# Patient Record
Sex: MALE | Race: WHITE | NOT HISPANIC OR LATINO | Employment: FULL TIME | URBAN - METROPOLITAN AREA
[De-identification: names, ages, dates, MRNs, and addresses within clinical notes are randomized per-mention and may not be internally consistent; named-entity substitution may affect disease eponyms.]

---

## 2019-03-28 ENCOUNTER — TELEPHONE (OUTPATIENT)
Dept: GASTROENTEROLOGY | Facility: CLINIC | Age: 29
End: 2019-03-28

## 2019-03-28 NOTE — TELEPHONE ENCOUNTER
----- Message from Maria Teresa Abdon sent at 3/28/2019 12:34 PM CDT -----  Contact: self   waqas    Needs Advice    Reason for call: wants appt for abdominal cramps        Communication Preference: [phone]    Additional Information:

## 2019-03-28 NOTE — TELEPHONE ENCOUNTER
Spoke with patient.  Called to request an appointment with  for abdominal cramping.  Aware 's first available will be in July.  Was ok with seeing one of the NP's or PA.   Was able to schedule him an appointment with Vanita Baron NP on 4/2 at 1:00.  Confirmation mailed.

## 2019-04-02 ENCOUNTER — OFFICE VISIT (OUTPATIENT)
Dept: GASTROENTEROLOGY | Facility: CLINIC | Age: 29
End: 2019-04-02
Payer: MEDICAID

## 2019-04-02 VITALS
HEIGHT: 66 IN | WEIGHT: 134.06 LBS | SYSTOLIC BLOOD PRESSURE: 143 MMHG | DIASTOLIC BLOOD PRESSURE: 92 MMHG | BODY MASS INDEX: 21.55 KG/M2 | HEART RATE: 90 BPM

## 2019-04-02 DIAGNOSIS — R10.9 ABDOMINAL PAIN, UNSPECIFIED ABDOMINAL LOCATION: ICD-10-CM

## 2019-04-02 DIAGNOSIS — R10.13 EPIGASTRIC PAIN: Primary | ICD-10-CM

## 2019-04-02 PROCEDURE — 99999 PR PBB SHADOW E&M-EST. PATIENT-LVL III: CPT | Mod: PBBFAC,,, | Performed by: NURSE PRACTITIONER

## 2019-04-02 PROCEDURE — 99999 PR PBB SHADOW E&M-EST. PATIENT-LVL III: ICD-10-PCS | Mod: PBBFAC,,, | Performed by: NURSE PRACTITIONER

## 2019-04-02 PROCEDURE — 99203 OFFICE O/P NEW LOW 30 MIN: CPT | Mod: S$PBB,,, | Performed by: NURSE PRACTITIONER

## 2019-04-02 PROCEDURE — 99203 PR OFFICE/OUTPT VISIT, NEW, LEVL III, 30-44 MIN: ICD-10-PCS | Mod: S$PBB,,, | Performed by: NURSE PRACTITIONER

## 2019-04-02 PROCEDURE — 99213 OFFICE O/P EST LOW 20 MIN: CPT | Mod: PBBFAC | Performed by: NURSE PRACTITIONER

## 2019-04-02 RX ORDER — HYOSCYAMINE SULFATE 0.12 MG/1
0.12 TABLET SUBLINGUAL EVERY 6 HOURS PRN
Qty: 30 TABLET | Refills: 0 | Status: SHIPPED | OUTPATIENT
Start: 2019-04-02 | End: 2019-05-05

## 2019-04-02 RX ORDER — DICYCLOMINE HYDROCHLORIDE 20 MG/1
TABLET ORAL
Refills: 1 | COMMUNITY
Start: 2019-03-28 | End: 2019-04-02

## 2019-04-02 NOTE — PROGRESS NOTES
[facility] Consultation Note    Reason for Consult:  The primary encounter diagnosis was Epigastric pain. A diagnosis of Abdominal pain, unspecified abdominal location was also pertinent to this visit.    PCP:   De Johns   No address on file    Referring MD:  Aaareferral Self  No address on file    HPI:  This is a 29 y.o. male here for evaluation of abdominal pain.    Abdominal pain. Reports abdominal pain  Character: Pressure, Gas  Location: Starts epigastric then travels lower  Frequency: Once every 6-8 months. Last episode Sunday  Duration: 2 Days  Onset: Last couple of years  Worse with: Certain foods. Beans, brussels sprouts, enmanuel cheese  Improves with: Bowel Movement, Bentyl, Beuno  Associated with Bm: yes  Nocturnal pain: yes  Antidepressants: no  Using narcotic pain medication: no   Improvement with BENTYL, beuno  Doesn't take probiotics consistantly  Gets chills with the pain  Typically has one soft normal BM a day. But during these periods of pain he reports hard BMs  Denies blood in the stool, melena, nausea, vomiting,    Occasional pyrosis a couple times a month. This is not related to the epigastric pain.    Li screening- Family Hx of:  Colon cancer- no  Uterine cancer-no  Small bowel cancer-no  Ureter cancer-no  Renal pelvis cancer-no     ROS:  Constitutional: No fevers, chills, No weight loss  ENT: No allergies  CV: No chest pain  Pulm: No cough, No shortness of breath  Ophtho: No vision changes  GI: see HPI  Derm: No rash  Heme:  No bruising  MSK: No arthritis  : No dysuria, No hematuria  Endo: No hot or cold intolerance  Neuro: No syncope, No seizure  Psych: No anxiety, No depression    Medical History:  has no past medical history on file.    Surgical History:  has no past surgical history on file.    Family History: family history is not on file..     Social History:  reports that he has never smoked. He has never used smokeless tobacco. He reports that he  "drinks alcohol.    Review of patient's allergies indicates:  No Known Allergies    Current Outpatient Medications on File Prior to Visit   Medication Sig Dispense Refill    [DISCONTINUED] dicyclomine (BENTYL) 20 mg tablet TK 1 T PO Q 6 H PRF STOMACH CRAMPS  1     No current facility-administered medications on file prior to visit.          Objective Findings:    Vital Signs:  BP (!) 143/92   Pulse 90   Ht 5' 6" (1.676 m)   Wt 60.8 kg (134 lb 0.6 oz)   BMI 21.63 kg/m²   Body mass index is 21.63 kg/m².    Physical Exam:  General Appearance: Well appearing in no acute distress  Head:   Normocephalic, without obvious abnormality  Eyes:    No scleral icterus  ENT: Neck supple  Lungs: CTA bilaterally in anterior and posterior fields, no wheezes, no crackles.  Heart:  Regular rate and rhythm, S1, S2 normal, no murmurs heard  Abdomen: Soft,  non distended with positive bowel sounds in all four quadrants. Mild Tenderness in RLQ  Extremities: No edema  Skin: No rash  Neurologic: AAO x 3      Labs:  Lab Results   Component Value Date    WBC 8.40 04/02/2019    HGB 15.3 04/02/2019    HCT 46.0 04/02/2019     04/02/2019    ALT 21 04/02/2019    AST 25 04/02/2019     04/02/2019    K 4.0 04/02/2019     04/02/2019    CREATININE 1.1 04/02/2019    BUN 17 04/02/2019    CO2 30 (H) 04/02/2019       Imaging:  None    Endoscopy:    No colonoscopy    Assessment:  29 y.o. WM with a chronic history of occasional abdominal pain. This pain may occur every 6-8 months and is usually related to meals and relieved with a BM. Reports Urgent care visit in the past prescribed him bentyl and that relieve the pain.   I will r/o H. Pylori and Celiac disease. This does not classify as IBS since it happens so rarely, however I feel like there may be an irritable component since the pain is relieved with a BM.       Recommendations:  1. R/o H/ pylori and IBS  2. Try Levsin for the pain when it does occur.    Follow up if symptoms " worsen or fail to improve.      Order summary:  Orders Placed This Encounter    CBC auto differential    Comprehensive metabolic panel    Celiac Disease Panel    H. PYLORI ANTIBODY, IGG    hyoscyamine (LEVSIN/SL) 0.125 mg Subl         Thank you so much for allowing me to participate in the care of Kei Baron, ELEUTERIOP-C

## 2019-04-05 ENCOUNTER — TELEPHONE (OUTPATIENT)
Dept: GASTROENTEROLOGY | Facility: CLINIC | Age: 29
End: 2019-04-05

## 2019-04-05 NOTE — TELEPHONE ENCOUNTER
----- Message from Vanita Baron NP sent at 4/5/2019 11:36 AM CDT -----  Please let pt know all his labs were normal. Thanks

## 2019-05-05 ENCOUNTER — OFFICE VISIT (OUTPATIENT)
Dept: URGENT CARE | Facility: CLINIC | Age: 29
End: 2019-05-05
Payer: MEDICAID

## 2019-05-05 VITALS
DIASTOLIC BLOOD PRESSURE: 95 MMHG | BODY MASS INDEX: 21.53 KG/M2 | OXYGEN SATURATION: 97 % | HEART RATE: 63 BPM | SYSTOLIC BLOOD PRESSURE: 164 MMHG | TEMPERATURE: 98 F | WEIGHT: 134 LBS | HEIGHT: 66 IN | RESPIRATION RATE: 16 BRPM

## 2019-05-05 DIAGNOSIS — S46.211A BICEPS MUSCLE STRAIN, RIGHT, INITIAL ENCOUNTER: Primary | ICD-10-CM

## 2019-05-05 PROCEDURE — 99203 PR OFFICE/OUTPT VISIT, NEW, LEVL III, 30-44 MIN: ICD-10-PCS | Mod: S$GLB,,, | Performed by: PHYSICIAN ASSISTANT

## 2019-05-05 PROCEDURE — 99203 OFFICE O/P NEW LOW 30 MIN: CPT | Mod: S$GLB,,, | Performed by: PHYSICIAN ASSISTANT

## 2019-05-05 RX ORDER — IBUPROFEN 800 MG/1
800 TABLET ORAL EVERY 8 HOURS PRN
Qty: 40 TABLET | Refills: 0 | Status: SHIPPED | OUTPATIENT
Start: 2019-05-05 | End: 2020-05-04

## 2019-05-05 RX ORDER — KETOROLAC TROMETHAMINE 30 MG/ML
30 INJECTION, SOLUTION INTRAMUSCULAR; INTRAVENOUS
Status: COMPLETED | OUTPATIENT
Start: 2019-05-05 | End: 2019-05-05

## 2019-05-05 RX ADMIN — KETOROLAC TROMETHAMINE 30 MG: 30 INJECTION, SOLUTION INTRAMUSCULAR; INTRAVENOUS at 09:05

## 2019-05-05 NOTE — PROGRESS NOTES
"Subjective:       Patient ID: Kei Sharma is a 29 y.o. male.    Vitals:  height is 5' 6" (1.676 m) and weight is 60.8 kg (134 lb). His oral temperature is 98.2 °F (36.8 °C). His blood pressure is 164/95 (abnormal) and his pulse is 63. His respiration is 16 and oxygen saturation is 97%.     Chief Complaint: Arm Pain    Last night patient was playing of [employer] game and held his right arm and an awkward position lifted for a long period of time.  This morning he woke up with pain we localizes to his right bicep.  Denies numbness tingling or weakness    Arm Pain    The incident occurred 12 to 24 hours ago. The incident occurred at home. The injury mechanism is unknown. The pain is present in the upper right arm. The quality of the pain is described as aching and burning. The pain radiates to the right hand. The pain is at a severity of 7/10. The pain is mild. The pain has been constant since the incident. Pertinent negatives include no chest pain. The symptoms are aggravated by movement, lifting and palpation. He has tried acetaminophen (ibuprofen ) for the symptoms. The treatment provided mild relief.       Constitution: Negative for chills, fatigue and fever.   HENT: Negative for congestion and sore throat.    Neck: Negative for painful lymph nodes.   Cardiovascular: Negative for chest pain and leg swelling.   Eyes: Negative for double vision and blurred vision.   Respiratory: Negative for cough and shortness of breath.    Gastrointestinal: Negative for nausea, vomiting and diarrhea.   Genitourinary: Negative for dysuria, frequency and urgency.   Musculoskeletal: Positive for pain. Negative for joint pain, joint swelling, muscle cramps and muscle ache.   Skin: Negative for color change, pale and rash.   Allergic/Immunologic: Negative for seasonal allergies.   Neurological: Negative for dizziness, history of vertigo, light-headedness, passing out and headaches.   Hematologic/Lymphatic: Negative for swollen lymph " nodes, easy bruising/bleeding and history of blood clots. Does not bruise/bleed easily.   Psychiatric/Behavioral: Negative for nervous/anxious, sleep disturbance and depression. The patient is not nervous/anxious.        Objective:      Physical Exam   Constitutional: He is oriented to person, place, and time. He appears well-developed and well-nourished. He is cooperative.  Non-toxic appearance. He does not appear ill. No distress.   HENT:   Head: Normocephalic and atraumatic.   Right Ear: Hearing, tympanic membrane, external ear and ear canal normal.   Left Ear: Hearing, tympanic membrane, external ear and ear canal normal.   Nose: Nose normal. No mucosal edema, rhinorrhea or nasal deformity. No epistaxis. Right sinus exhibits no maxillary sinus tenderness and no frontal sinus tenderness. Left sinus exhibits no maxillary sinus tenderness and no frontal sinus tenderness.   Mouth/Throat: Uvula is midline, oropharynx is clear and moist and mucous membranes are normal. No trismus in the jaw. Normal dentition. No uvula swelling. No posterior oropharyngeal erythema.   Eyes: Conjunctivae and lids are normal. Right eye exhibits no discharge. Left eye exhibits no discharge. No scleral icterus.   Sclera clear bilat   Neck: Trachea normal, normal range of motion, full passive range of motion without pain and phonation normal. Neck supple.   Cardiovascular: Normal rate, regular rhythm, normal heart sounds, intact distal pulses and normal pulses.   Pulmonary/Chest: Effort normal and breath sounds normal. No respiratory distress.   Abdominal: Soft. Normal appearance and bowel sounds are normal. He exhibits no distension, no pulsatile midline mass and no mass. There is no tenderness.   Musculoskeletal: Normal range of motion. He exhibits no edema or deformity.        Right shoulder: He exhibits normal range of motion, no tenderness, no bony tenderness, no swelling, no effusion, no crepitus, no deformity, no laceration, no pain,  no spasm, normal pulse and normal strength.        Right elbow: He exhibits normal range of motion, no swelling, no effusion, no deformity and no laceration. No tenderness found.        Right wrist: He exhibits normal range of motion, no tenderness, no bony tenderness, no swelling, no effusion, no crepitus, no deformity and no laceration.        Right upper arm: He exhibits tenderness. He exhibits no bony tenderness, no swelling, no edema, no deformity and no laceration.        Right forearm: He exhibits no tenderness, no bony tenderness, no swelling, no edema, no deformity and no laceration.        Right hand: He exhibits normal range of motion, no tenderness, no bony tenderness, normal two-point discrimination, normal capillary refill, no deformity, no laceration and no swelling. Normal sensation noted. Normal strength noted.        Left hand: He exhibits normal range of motion, no tenderness, no bony tenderness, normal two-point discrimination, normal capillary refill, no deformity, no laceration and no swelling. Normal sensation noted. Normal strength noted.   TTP RIGHT BICEPS MUSCLE BELLY   Neurological: He is alert and oriented to person, place, and time. He exhibits normal muscle tone. Coordination normal.   Skin: Skin is warm, dry and intact. He is not diaphoretic. No pallor.   Psychiatric: He has a normal mood and affect. His speech is normal and behavior is normal. Judgment and thought content normal. Cognition and memory are normal.   Nursing note and vitals reviewed.      Assessment:       1. Biceps muscle strain, right, initial encounter        Plan:         Biceps muscle strain, right, initial encounter  -     ketorolac injection 30 mg  -     ibuprofen (ADVIL,MOTRIN) 800 MG tablet; Take 1 tablet (800 mg total) by mouth every 8 (eight) hours as needed.  Dispense: 40 tablet; Refill: 0          Muscle Strain in the Extremities  A muscle strain is a stretching and tearing of muscle fibers. This causes  pain, especially when you move that muscle. There may also be some swelling and bruising.  Home care  · Keep the hurt area raised to reduce pain and swelling. This is especially important during the first 48 hours.  · Apply an ice pack over the injured area for 15 to 20 minutes every 3 to 6 hours. You should do this for the first 24 to 48 hours. You can make an ice pack by filling a plastic bag that seals at the top with ice cubes and then wrapping it with a thin towel. Be careful not to injure your skin with the ice treatments. Ice should never be applied directly to skin. Continue the use of ice packs for relief of pain and swelling as needed. After 48 hours, apply heat (warm shower or warm bath) for 15 to 20 minutes several times a day, or alternate ice and heat.  · You may use over-the-counter pain medicine to control pain, unless another medicine was prescribed. If you have chronic liver or kidney disease or ever had a stomach ulcer or GI bleeding, talk with your healthcare provider before using these medicines.  · For leg strains: If crutches have been recommended, dont put full weight on the hurt leg until you can do so without pain. You can return to sports when you are able to hop and run on the injured leg without pain.  Follow-up care  Follow up with your healthcare provider, or as advised.  When to seek medical advice  Call your healthcare provider right away if any of these occur:  · The toes of the injured leg become swollen, cold, blue, numb, or tingly  · Pain or swelling increases  Date Last Reviewed: 11/19/2015 © 2000-2017 [employer]. [address] [place] [address], [place] [address]. All rights reserved. This information is not intended as a substitute for professional medical care. Always follow your healthcare professional's instructions.      Please follow up with your Primary care provider within 2-5 days if your signs and symptoms have not resolved or worsen.     If your condition  worsens or fails to improve we recommend that you receive another evaluation at the emergency room immediately or contact your primary medical clinic to discuss your concerns.   You must understand that you have received an Urgent Care treatment only and that you may be released before all of your medical problems are known or treated. You, the patient, will arrange for follow up care as instructed.     RED FLAGS/WARNING SYMPTOMS DISCUSSED WITH PATIENT THAT WOULD WARRANT EMERGENT MEDICAL ATTENTION. PATIENT VERBALIZED UNDERSTANDING.     Elevated Blood Pressure  Your blood pressure was elevated during your visit to the urgent care.  It was not so high that immediate care was needed but it is recommended that you monitor your blood pressure over the next week or two to make sure that it is not staying elevated.  Please have your blood pressure taken 2-3 times daily at different times of the day.  Write all of those blood pressures down and record the time that they were taken.  Keep all that information and take it with you to see your Primary Care Physician.  If your blood pressure is consistently above 140/90 you will need to follow up with your PCP more quickly

## 2019-05-05 NOTE — PATIENT INSTRUCTIONS
Muscle Strain in the Extremities  A muscle strain is a stretching and tearing of muscle fibers. This causes pain, especially when you move that muscle. There may also be some swelling and bruising.  Home care  · Keep the hurt area raised to reduce pain and swelling. This is especially important during the first 48 hours.  · Apply an ice pack over the injured area for 15 to 20 minutes every 3 to 6 hours. You should do this for the first 24 to 48 hours. You can make an ice pack by filling a plastic bag that seals at the top with ice cubes and then wrapping it with a thin towel. Be careful not to injure your skin with the ice treatments. Ice should never be applied directly to skin. Continue the use of ice packs for relief of pain and swelling as needed. After 48 hours, apply heat (warm shower or warm bath) for 15 to 20 minutes several times a day, or alternate ice and heat.  · You may use over-the-counter pain medicine to control pain, unless another medicine was prescribed. If you have chronic liver or kidney disease or ever had a stomach ulcer or GI bleeding, talk with your healthcare provider before using these medicines.  · For leg strains: If crutches have been recommended, dont put full weight on the hurt leg until you can do so without pain. You can return to sports when you are able to hop and run on the injured leg without pain.  Follow-up care  Follow up with your healthcare provider, or as advised.  When to seek medical advice  Call your healthcare provider right away if any of these occur:  · The toes of the injured leg become swollen, cold, blue, numb, or tingly  · Pain or swelling increases  Date Last Reviewed: 11/19/2015 © 2000-2017 [employer]. [address] [place] [address], [place] [address]. All rights reserved. This information is not intended as a substitute for professional medical care. Always follow your healthcare professional's instructions.      Please follow up with your Primary  care provider within 2-5 days if your signs and symptoms have not resolved or worsen.     If your condition worsens or fails to improve we recommend that you receive another evaluation at the emergency room immediately or contact your primary medical clinic to discuss your concerns.   You must understand that you have received an Urgent Care treatment only and that you may be released before all of your medical problems are known or treated. You, the patient, will arrange for follow up care as instructed.     RED FLAGS/WARNING SYMPTOMS DISCUSSED WITH PATIENT THAT WOULD WARRANT EMERGENT MEDICAL ATTENTION. PATIENT VERBALIZED UNDERSTANDING.     Elevated Blood Pressure  Your blood pressure was elevated during your visit to the urgent care.  It was not so high that immediate care was needed but it is recommended that you monitor your blood pressure over the next week or two to make sure that it is not staying elevated.  Please have your blood pressure taken 2-3 times daily at different times of the day.  Write all of those blood pressures down and record the time that they were taken.  Keep all that information and take it with you to see your Primary Care Physician.  If your blood pressure is consistently above 140/90 you will need to follow up with your PCP more quickly

## 2020-08-11 ENCOUNTER — TELEPHONE (OUTPATIENT)
Dept: INFECTIOUS DISEASES | Facility: CLINIC | Age: 30
End: 2020-08-11

## 2020-08-11 NOTE — TELEPHONE ENCOUNTER
DATE OF CALL: 8/11/2020  PROTOCOL: 2020.198  TITLE:  A phase 1/2/3, placebo-controlled, randomized, observer-blind, dose finding study to evaluate the safety, tolerability, immunogenicity, and efficacy of SARS-COV-2 RNA vaccine candidates against COVID-19 in healthy adults.  INVESTIGATOR: Magui Peterson  SPONSOR: [employer] is conducting the study for [employer]     Subject was contacted regarding participation in the [employer] COVID 19 Vaccine study. Upon screening, pt. would appear to meet inclusion/exclusion criteria.  Pt is scheduled for initial Pf[employer] vaccine study visit on 8/19/2020 @ 1pm .

## 2020-08-19 ENCOUNTER — CLINICAL SUPPORT (OUTPATIENT)
Dept: RESEARCH | Facility: CLINIC | Age: 30
End: 2020-08-19
Payer: MEDICAID

## 2020-08-19 DIAGNOSIS — Z23 NEED FOR VACCINATION: Primary | ICD-10-CM

## 2020-08-19 PROCEDURE — 91300 COVID-19, MRNA, LNP-S, PF, 30 MCG/0.3 ML DOSE VACCINE: ICD-10-PCS | Mod: ,,, | Performed by: INTERNAL MEDICINE

## 2020-08-19 PROCEDURE — 91300 COVID-19, MRNA, LNP-S, PF, 30 MCG/0.3 ML DOSE VACCINE: CPT | Mod: ,,, | Performed by: INTERNAL MEDICINE

## 2020-08-19 PROCEDURE — 99999 PR PBB SHADOW E&M-EST. PATIENT-LVL I: ICD-10-PCS | Mod: PBBFAC,,,

## 2020-08-19 PROCEDURE — 99211 OFF/OP EST MAY X REQ PHY/QHP: CPT | Mod: PBBFAC

## 2020-08-19 PROCEDURE — 99999 PR PBB SHADOW E&M-EST. PATIENT-LVL I: CPT | Mod: PBBFAC,,,

## 2020-08-19 NOTE — PROGRESS NOTES
Study title: A Phase 1/2/3. Placebo Controlled, Randomized, Observer-Blind, Dose-Finding Study to Describe the Safety, Tolerability, Immunogenicity and Potential Efficacy of SARS-COV-2 RNA Vaccine Candidates Against COVID-19 in Health Adults   IRB #: 2020.198  Sponsor: [employer]   Sponsor's Protocol: [id number]  Site Number: [id number]  Subject ID: [id number]    Visit Assessments; 8/19/2020    Screening of inclusion/exclusion criteria evaluation for [id number] has been reviewed by Shanon Rios. At this time, Kei Sharma meets all inclusion and does not meet any one of the exclusion criteria.   Screening procedures completed during this visit include the following:   Demographic data (including gender, age, ethnicity, date of birth);    Height and weight obtained;   Vital Signs  o B/P: 140/92  o Temperature: 98.8 F  o Pulse: 82    Reviewed and confirmed medical history in the past 6 months;   Review and confirmed of concomitant medications in the past 6 months;   Contraceptive discussion with Kei Sharma. Patient expressed full understanding of adequate contraceptive measures and will contact site immediately if any changes are made in contraceptives;   Was UPT completed? No  o If no, UPT was not performed because subject is male    Physical exam deemed NOT necessary per P.I./Sub-I discretion. I, Dr Peterson reviewed eligibility and agree with assessments. Subject will be randomized.

## 2020-08-19 NOTE — PROGRESS NOTES
Date consent signed: 8/19/2020    Study title: A Phase 1/2/3. Placebo Controlled, Randomized, Observer-Blind, Dose-Finding Study to Describe the Safety, Tolerability, Immunogenicity and Potential Efficacy of SARS-COV-2 RNA Vaccine Candidates Against COVID-19 in Health Adults   IRB #: 2020.198  Sponsor: [employer]   Sponsor's Protocol: [id number]  Site Number: [id number]  Subject ID: [id number]    Informed Consent Process  Present for discussion: Ryan Callegari, Corinne Abraham  Was the consent done electronically: yes     Prior to the Informed Consent (IC) being signed, or any protocol required testing, procedure, or intervention being performed, the following was done or discussed:  · Purpose of the Study, Qualifications to Participate: yes  · Study Design, Schedule and Procedures: yes  · Risks, Benefits, Alternative Treatments, Compensation and Costs: yes  · Confidentiality and HIPAA Authorization for Release of Medical Records for the research trial/subject's right/study related injury: yes  · Study related contact information: yes  · Voluntary Participation and Withdrawal from the research trial at any time: yes  · Patient has been offered the opportunity to ask questions regarding the study and all questions were answered satisfactorily: yes  · CRC and PI contact information given to patient: yes  · Signed copy given to patient: yes  · Copy in patient's chart and original uploaded to Epic: yes    Patient able to adequately summarize: the purpose of the study, the risks associated with the study, and all procedures, testing, and follow-ups associated with the study: yes    Kei Willardrick Zachary electronically signed the informed consent form with an IRB approval date of 8/6/2020. Each slide of the eConsent form was reviewed with him and all questions answered satisfactorily. He then electronically signed the consent form, which was countersigned by the CRC on this day. A copy of the fully executed ICF was then provided to  the subject via paper copy. The original consent was electronically saved and uploaded to the [facility] EMR ([facility]) and filed appropriately.     Person Obtaining Consent: Corinne Aileen Cronin Abraham

## 2020-09-11 ENCOUNTER — OFFICE VISIT (OUTPATIENT)
Dept: RESEARCH | Facility: CLINIC | Age: 30
End: 2020-09-11
Payer: MEDICAID

## 2020-09-11 DIAGNOSIS — Z00.6 RESEARCH SUBJECT: Primary | ICD-10-CM

## 2020-09-11 DIAGNOSIS — Z23 NEED FOR VACCINATION: ICD-10-CM

## 2020-09-11 PROCEDURE — 91300 COVID-19, MRNA, LNP-S, PF, 30 MCG/0.3 ML DOSE VACCINE: ICD-10-PCS | Mod: ,,, | Performed by: INTERNAL MEDICINE

## 2020-09-11 PROCEDURE — 99999 PR PBB SHADOW E&M-EST. PATIENT-LVL I: ICD-10-PCS | Mod: PBBFAC,,,

## 2020-09-11 PROCEDURE — 99999 PR PBB SHADOW E&M-EST. PATIENT-LVL I: CPT | Mod: PBBFAC,,,

## 2020-09-11 PROCEDURE — 91300 COVID-19, MRNA, LNP-S, PF, 30 MCG/0.3 ML DOSE VACCINE: CPT | Mod: ,,, | Performed by: INTERNAL MEDICINE

## 2020-09-11 PROCEDURE — 99211 OFF/OP EST MAY X REQ PHY/QHP: CPT | Mod: PBBFAC

## 2020-09-11 NOTE — PROGRESS NOTES
Study title: A Phase 1/2/3. Placebo Controlled, Randomized, Observer-Blind, Dose-Finding Study to Describe the Safety, Tolerability, Immunogenicity and Potential Efficacy of SARS-COV-2 RNA Vaccine Candidates Against COVID-19 in Health Adults   IRB #: 2020.198  Sponsor: [employer]   Sponsor's Protocol: [id number]  Site Number: [id number]  Subject ID: [id number]    Visit Assessments; 9/11/2020    The Subject presented for Visit 2 appointment as previously scheduled. Present during the visit are , Linnette Denise and participant, Kei Sharma. Participant wishes to continue participation in the trial, understands participation is voluntary and can withdraw at any point during the trial.     Visit 2 procedures completed during this visit include the following:      Review of Inclusion and Exclusion to determine the Subject's continued eligibility for study participation. Patient continues to meet eligibility. PI/Sub-I confirms and agrees to continued eligibility.    Review of Delay Criteria to confirm that the Subject is eligible for their second vaccination during today's visit.    Reviewed of adverse events since the Subject's Screening Visit.    Review for changes in concomitant medications since Subject's Screening Visit.    Review for changes in Contraceptive with Kei Sharma. If patient of childbearing potential or male capable of producing, adequate contraceptive refresher discussion occurred. Patient expressed full understanding of adequate contraceptive measures and will contact site immediately if any changes are made in contraceptives.    Was UPT completed? no  o UPT was not performed because subject is male    Per protocol, no physical required at V2 unless new or major health changes since last visit  or at PI discretion.     All visit assessment procedures have been completed according to protocol, IP order will be placed, and second vaccine will be prepared.

## 2020-09-12 ENCOUNTER — NURSE TRIAGE (OUTPATIENT)
Dept: ADMINISTRATIVE | Facility: CLINIC | Age: 30
End: 2020-09-12

## 2020-09-13 NOTE — TELEPHONE ENCOUNTER
Pt states that he has been having chills and fever of 102. Pt states that he has taken tylenol without relief at this time. Pt state that he is currently on a trial study for COVID 19.  Pt advised per protocol and verbalized understanding.    Reason for Disposition   [1] Fever AND [2] no signs of serious infection or localizing symptoms (all other triage questions negative)    Additional Information   Negative: Shock suspected (e.g., cold/pale/clammy skin, too weak to stand, low BP, rapid pulse)   Negative: Difficult to awaken or acting confused (e.g., disoriented, slurred speech)   Negative: [1] Difficulty breathing AND [2] bluish lips, tongue or face   Negative: New onset rash with multiple purple (or blood-colored) spots or dots   Negative: Sounds like a life-threatening emergency to the triager   Negative: [1] Headache AND [2] stiff neck (can't touch chin to chest)   Negative: Difficulty breathing   Negative: IV drug abuse   Negative: [1] Drinking very little AND [2] dehydration suspected (e.g., no urine > 12 hours, very dry mouth, very lightheaded)   Negative: Patient sounds very sick or weak to the triager  (Exception: mild weakness and hasn't taken fever medicine)   Negative: Fever > 104 F (40 C)   Negative: [1] Fever > 101 F (38.3 C) AND [2] age > 60   Negative: [1] Fever > 100.0 F (37.8 C) AND [2] bedridden (e.g., nursing home patient, CVA, chronic illness, recovering from surgery)   Negative: [1] Fever > 100.0 F (37.8 C) AND [2] indwelling urinary catheter (e.g., Rodriguez, Coude)   Negative: [1] Fever > 100.0 F (37.8 C) AND [2] has port (portacath), central line, or PICC line   Negative: [1] Fever > 100.0 F (37.8 C) AND [2] diabetes mellitus or weak immune system (e.g., HIV positive, cancer chemo, splenectomy, organ transplant, chronic steroids)   Negative: [1] Fever > 100.0 F (37.8 C) AND [2] surgery in the last month   Negative: Transplant patient (e.g., kidney, liver, lung, heart)    Negative: Fever present > 3 days (72 hours)   Negative: [1] Fever > 100.0 F (37.8 C) AND [2] foreign travel to a developing country in the last month   Negative: [1] Intermittent fever > 100.0 F (37.8 C) AND [2] lasts > 3 weeks    Protocols used: FEVER-A-AH

## 2020-09-14 ENCOUNTER — TELEPHONE (OUTPATIENT)
Dept: RESEARCH | Facility: CLINIC | Age: 30
End: 2020-09-14

## 2020-09-14 NOTE — TELEPHONE ENCOUNTER
Study title: A Phase 1/2/3. Placebo Controlled, Randomized, Observer-Blind, Dose-Finding Study to Describe the Safety, Tolerability, Immunogenicity and Potential Efficacy of SARS-COV-2 RNA Vaccine Candidates Against COVID-19 in Health Adults   IRB #: 2020.198  Sponsor: [employer]   Sponsor's Protocol: [id number]  Site Number: [id number]  Subject ID: [id number]    Subject reported fever of 102 on [id number]. Reported no other symptoms. Subject spoke with triage nurse who instructed subject to take Tylenol. Fever started to go down once Tylenol taken. Subject now has temp of 98.5. No potential COVID visit needed per PI discretion.

## 2020-09-28 ENCOUNTER — TELEPHONE (OUTPATIENT)
Dept: RESEARCH | Facility: CLINIC | Age: 30
End: 2020-09-28

## 2020-09-28 NOTE — TELEPHONE ENCOUNTER
Study title: A Phase 1/2/3. Placebo Controlled, Randomized, Observer-Blind, Dose-Finding Study to Describe the Safety, Tolerability, Immunogenicity and Potential Efficacy of SARS-COV-2 RNA Vaccine Candidates Against COVID-19 in Health Adults   IRB #: 2020.198  Sponsor: [employer]   Sponsor's Protocol: [id number]  Site Number: [id number]  Subject ID: [id number]    Patient has not completed COVID-19 Illness Diary in over one week.     Patient reminded to complete COVID-19 Illness Diary at least once a week, or anytime they experience symptoms. States that he will complete it today. He has not been getting notifications - instructed to set a personal reminder.     Patient was re-educated on completing eDiary as required per protocol. Patient expresses understanding.

## 2020-10-14 ENCOUNTER — RESEARCH ENCOUNTER (OUTPATIENT)
Dept: RESEARCH | Facility: CLINIC | Age: 30
End: 2020-10-14
Payer: MEDICAID

## 2020-10-14 NOTE — PROGRESS NOTES
Study title: A Phase 1/2/3. Placebo Controlled, Randomized, Observer-Blind, Dose-Finding Study to Describe the Safety, Tolerability, Immunogenicity and Potential Efficacy of SARS-COV-2 RNA Vaccine Candidates Against COVID-19 in Health Adults   IRB #: 2020.198  Sponsor: [employer]   Sponsor's Protocol: [id number]  Site Number: [id number]  Subject ID: [id number]    Visit Assessments; 10/14/2020    The Subject presented for Visit 3 appointment as previously scheduled. Present during the visit are , Paula Walker and participant, Kei Sharma. Participant wishes to continue participation in the trial, understands participation is voluntary and can withdraw at any point during the trial.     Visit 3 procedures completed during this visit include the following:      RADHA Freeman -Reviewed of adverse events since the Subject's Screening Visit.    RADHA Freeman -Review for changes in concomitant medications since Subject's Screening Visit.    RADHA Freeman -Review for changes in Contraceptive with Kei Sharma. If patient of childbearing potential or male capable of producing, adequate contraceptive refresher discussion occurred. Patient expressed full understanding of adequate contraceptive measures and will contact site immediately if any changes are made in contraceptives.        All visit assessment procedures have been completed according to protocol.

## 2020-12-23 ENCOUNTER — RESEARCH ENCOUNTER (OUTPATIENT)
Dept: RESEARCH | Facility: HOSPITAL | Age: 30
End: 2020-12-23

## 2020-12-23 NOTE — PROGRESS NOTES
Study title: A Phase 1/2/3. Placebo Controlled, Randomized, Observer-Blind, Dose-Finding Study to Describe the Safety, Tolerability, Immunogenicity and Potential Efficacy of SARS-COV-2 RNA Vaccine Candidates Against COVID-19 in Healthy Adults   IRB #: 2020.198  Sponsor: [employer]   Sponsor's Protocol: [id number]  Site Number: [id number]  Subject ID: [id number]     Subject contacted site regarding Pfizer's COVID-19 Vaccine Emergency Use Authorization (EUA). Site explained the process of unbinding at two potential time points. Kei Sharma stated he understands.     Does subject wish to be unblinded? yes    Kei Sharma was informed that unblinded site staff will perform unblinding procedures and site will follow up.

## 2021-02-15 ENCOUNTER — RESEARCH ENCOUNTER (OUTPATIENT)
Dept: RESEARCH | Facility: HOSPITAL | Age: 31
End: 2021-02-15

## 2021-03-12 ENCOUNTER — RESEARCH ENCOUNTER (OUTPATIENT)
Dept: RESEARCH | Facility: CLINIC | Age: 31
End: 2021-03-12
Payer: MEDICAID

## 2021-05-20 ENCOUNTER — TELEPHONE (OUTPATIENT)
Dept: RESEARCH | Facility: HOSPITAL | Age: 31
End: 2021-05-20

## 2021-08-03 ENCOUNTER — TELEPHONE (OUTPATIENT)
Dept: RESEARCH | Facility: CLINIC | Age: 31
End: 2021-08-03

## 2021-08-25 ENCOUNTER — TELEPHONE (OUTPATIENT)
Dept: RESEARCH | Facility: HOSPITAL | Age: 31
End: 2021-08-25

## 2021-08-26 ENCOUNTER — TELEPHONE (OUTPATIENT)
Dept: RESEARCH | Facility: HOSPITAL | Age: 31
End: 2021-08-26

## 2021-08-27 ENCOUNTER — RESEARCH ENCOUNTER (OUTPATIENT)
Dept: RESEARCH | Facility: CLINIC | Age: 31
End: 2021-08-27
Payer: MEDICAID

## 2021-10-04 ENCOUNTER — TELEPHONE (OUTPATIENT)
Dept: RESEARCH | Facility: CLINIC | Age: 31
End: 2021-10-04

## 2021-10-07 ENCOUNTER — TELEPHONE (OUTPATIENT)
Dept: RESEARCH | Facility: HOSPITAL | Age: 31
End: 2021-10-07

## 2021-10-08 ENCOUNTER — RESEARCH ENCOUNTER (OUTPATIENT)
Dept: RESEARCH | Facility: CLINIC | Age: 31
End: 2021-10-08
Payer: MEDICAID

## 2021-10-08 DIAGNOSIS — Z23 NEED FOR VACCINATION: Primary | ICD-10-CM

## 2021-10-08 PROCEDURE — 91300 COVID-19, MRNA, LNP-S, PF, 30 MCG/0.3 ML DOSE VACCINE: CPT | Mod: PBBFAC

## 2021-11-11 ENCOUNTER — RESEARCH ENCOUNTER (OUTPATIENT)
Dept: RESEARCH | Facility: HOSPITAL | Age: 31
End: 2021-11-11
Payer: MEDICAID

## 2021-12-20 ENCOUNTER — TELEPHONE (OUTPATIENT)
Dept: NEUROLOGY | Facility: CLINIC | Age: 31
End: 2021-12-20
Payer: COMMERCIAL

## 2022-02-17 ENCOUNTER — TELEPHONE (OUTPATIENT)
Dept: NEUROLOGY | Facility: CLINIC | Age: 32
End: 2022-02-17
Payer: COMMERCIAL

## 2022-02-17 ENCOUNTER — TELEPHONE (OUTPATIENT)
Dept: NEUROLOGY | Facility: CLINIC | Age: 32
End: 2022-02-17

## 2022-02-17 NOTE — TELEPHONE ENCOUNTER
----- Message from Margot Javier sent at 2/17/2022  1:29 PM CST -----  Contact: Patient  Type: Patient Call Back         Who called: Patient         What is the request in detail: I have Kei Sharma calling to resched his appt that he cxld on 1/7 fro hand spasm; wanted to see if he could be seen by Dr. Wilson if possible; please advise         [facility] Call back         Would the patient rather a call back or a response via [facility]? Call back         Best call back number: [phone]         Additional Information: prefers afternoon appts anyday of the week           Thank You

## 2022-02-17 NOTE — TELEPHONE ENCOUNTER
----- Message from Margot Javier sent at 2/17/2022  1:29 PM CST -----  Contact: Patient  Type: Patient Call Back         Who called: Patient         What is the request in detail: I have Kei Sharma calling to resched his appt that he cxld on 1/7 fro hand spasm; wanted to see if he could be seen by Dr. Wilson if possible; please advise         [facility]         Would the patient rather a call back or a response via [facility]? Call back         Best call back number: [phone]         Additional Information: prefers afternoon appts anyday of the week           Thank You

## 2022-02-25 ENCOUNTER — LAB VISIT (OUTPATIENT)
Dept: LAB | Facility: HOSPITAL | Age: 32
End: 2022-02-25
Payer: COMMERCIAL

## 2022-02-25 ENCOUNTER — OFFICE VISIT (OUTPATIENT)
Dept: NEUROLOGY | Facility: CLINIC | Age: 32
End: 2022-02-25
Payer: COMMERCIAL

## 2022-02-25 VITALS
WEIGHT: 117.75 LBS | HEIGHT: 66 IN | BODY MASS INDEX: 18.92 KG/M2 | DIASTOLIC BLOOD PRESSURE: 94 MMHG | HEART RATE: 105 BPM | SYSTOLIC BLOOD PRESSURE: 137 MMHG

## 2022-02-25 DIAGNOSIS — R74.01 TRANSAMINITIS: ICD-10-CM

## 2022-02-25 DIAGNOSIS — F41.1 ANXIETY, GENERALIZED: ICD-10-CM

## 2022-02-25 DIAGNOSIS — R29.0 CARPOPEDAL SPASM: Primary | ICD-10-CM

## 2022-02-25 DIAGNOSIS — R29.0 CARPOPEDAL SPASM: ICD-10-CM

## 2022-02-25 LAB
ALBUMIN SERPL BCP-MCNC: 4.3 G/DL (ref 3.5–5.2)
ALP SERPL-CCNC: 41 U/L (ref 55–135)
ALT SERPL W/O P-5'-P-CCNC: 50 U/L (ref 10–44)
ANION GAP SERPL CALC-SCNC: 10 MMOL/L (ref 8–16)
AST SERPL-CCNC: 65 U/L (ref 10–40)
BASOPHILS # BLD AUTO: 0.05 K/UL (ref 0–0.2)
BASOPHILS NFR BLD: 0.6 % (ref 0–1.9)
BILIRUB SERPL-MCNC: 1.5 MG/DL (ref 0.1–1)
BUN SERPL-MCNC: 14 MG/DL (ref 6–20)
CALCIUM SERPL-MCNC: 10.1 MG/DL (ref 8.7–10.5)
CHLORIDE SERPL-SCNC: 96 MMOL/L (ref 95–110)
CO2 SERPL-SCNC: 33 MMOL/L (ref 23–29)
CREAT SERPL-MCNC: 1.3 MG/DL (ref 0.5–1.4)
DIFFERENTIAL METHOD: ABNORMAL
EOSINOPHIL # BLD AUTO: 0.3 K/UL (ref 0–0.5)
EOSINOPHIL NFR BLD: 3.2 % (ref 0–8)
ERYTHROCYTE [DISTWIDTH] IN BLOOD BY AUTOMATED COUNT: 11.4 % (ref 11.5–14.5)
EST. GFR  (AFRICAN AMERICAN): >60 ML/MIN/1.73 M^2
EST. GFR  (NON AFRICAN AMERICAN): >60 ML/MIN/1.73 M^2
GLUCOSE SERPL-MCNC: 98 MG/DL (ref 70–110)
HCT VFR BLD AUTO: 44.1 % (ref 40–54)
HGB BLD-MCNC: 14.9 G/DL (ref 14–18)
IMM GRANULOCYTES # BLD AUTO: 0.02 K/UL (ref 0–0.04)
IMM GRANULOCYTES NFR BLD AUTO: 0.3 % (ref 0–0.5)
LYMPHOCYTES # BLD AUTO: 2.6 K/UL (ref 1–4.8)
LYMPHOCYTES NFR BLD: 32.9 % (ref 18–48)
MAGNESIUM SERPL-MCNC: 1.7 MG/DL (ref 1.6–2.6)
MCH RBC QN AUTO: 32 PG (ref 27–31)
MCHC RBC AUTO-ENTMCNC: 33.8 G/DL (ref 32–36)
MCV RBC AUTO: 95 FL (ref 82–98)
MONOCYTES # BLD AUTO: 0.7 K/UL (ref 0.3–1)
MONOCYTES NFR BLD: 9.4 % (ref 4–15)
NEUTROPHILS # BLD AUTO: 4.2 K/UL (ref 1.8–7.7)
NEUTROPHILS NFR BLD: 53.6 % (ref 38–73)
NRBC BLD-RTO: 0 /100 WBC
PHOSPHATE SERPL-MCNC: 4 MG/DL (ref 2.7–4.5)
PLATELET # BLD AUTO: 318 K/UL (ref 150–450)
PMV BLD AUTO: 9.8 FL (ref 9.2–12.9)
POTASSIUM SERPL-SCNC: 4.2 MMOL/L (ref 3.5–5.1)
PROT SERPL-MCNC: 7.9 G/DL (ref 6–8.4)
RBC # BLD AUTO: 4.65 M/UL (ref 4.6–6.2)
SODIUM SERPL-SCNC: 139 MMOL/L (ref 136–145)
WBC # BLD AUTO: 7.76 K/UL (ref 3.9–12.7)

## 2022-02-25 PROCEDURE — 80053 COMPREHEN METABOLIC PANEL: CPT | Performed by: STUDENT IN AN ORGANIZED HEALTH CARE EDUCATION/TRAINING PROGRAM

## 2022-02-25 PROCEDURE — 84100 ASSAY OF PHOSPHORUS: CPT | Performed by: STUDENT IN AN ORGANIZED HEALTH CARE EDUCATION/TRAINING PROGRAM

## 2022-02-25 PROCEDURE — 99203 PR OFFICE/OUTPT VISIT, NEW, LEVL III, 30-44 MIN: ICD-10-PCS | Mod: S$GLB,,, | Performed by: STUDENT IN AN ORGANIZED HEALTH CARE EDUCATION/TRAINING PROGRAM

## 2022-02-25 PROCEDURE — 85025 COMPLETE CBC W/AUTO DIFF WBC: CPT | Performed by: STUDENT IN AN ORGANIZED HEALTH CARE EDUCATION/TRAINING PROGRAM

## 2022-02-25 PROCEDURE — 36415 COLL VENOUS BLD VENIPUNCTURE: CPT | Performed by: STUDENT IN AN ORGANIZED HEALTH CARE EDUCATION/TRAINING PROGRAM

## 2022-02-25 PROCEDURE — 99999 PR PBB SHADOW E&M-EST. PATIENT-LVL III: CPT | Mod: PBBFAC,,, | Performed by: STUDENT IN AN ORGANIZED HEALTH CARE EDUCATION/TRAINING PROGRAM

## 2022-02-25 PROCEDURE — 83735 ASSAY OF MAGNESIUM: CPT | Performed by: STUDENT IN AN ORGANIZED HEALTH CARE EDUCATION/TRAINING PROGRAM

## 2022-02-25 PROCEDURE — 99203 OFFICE O/P NEW LOW 30 MIN: CPT | Mod: S$GLB,,, | Performed by: STUDENT IN AN ORGANIZED HEALTH CARE EDUCATION/TRAINING PROGRAM

## 2022-02-25 PROCEDURE — 99999 PR PBB SHADOW E&M-EST. PATIENT-LVL III: ICD-10-PCS | Mod: PBBFAC,,, | Performed by: STUDENT IN AN ORGANIZED HEALTH CARE EDUCATION/TRAINING PROGRAM

## 2022-02-25 NOTE — PROGRESS NOTES
[facility] - NEUROLOGY [address] [facility], [place]    Date: 2/25/22  Patient Name: Kei Sharma   MRN: [id number]   PCP: De Johns  Referring Provider: Self, Aaareferral    Assessment:     Kei Sharma is a 32 y.o. male presenting for evaluation and management of spasms;    Carpopedal Spasm; secondary to respiratory alkalosis. Ordered CBC/CMP to rule out transient electrolyte changes. Counseled on behavioral strategies during moments of excitement/hyperventilation.     Generalized Anxiety; previously seen by Psychiatry as an adolescent. Interested in re-establishing with Psychiatry for anxiety. Ordered ambulatory referral to Psychiatry for further management.     Transaminitis; mild. Noted on CMP following clinic visit. Contacted patient by phone following visit and discussed lab results. Likely secondary to increased EtOH use (admits to drinking more during Mardi Gras). Counseled on EtOH use in moderation. Ordered ambulatory referral to Internal Medicine for follow up and repeat labs. Advised to go to ED if experiencing severe abdominal pain, changes in bowel or bladder habits, or skin discoloration. Patient in agreement with plan.    Plan:     Problem List Items Addressed This Visit        Psychiatric    Anxiety, generalized    Current Assessment & Plan     - follow up with Psychiatry           Relevant Orders    Ambulatory referral/consult to Psychiatry       GI    Transaminitis    Current Assessment & Plan     - follow up with Internal Medicine/PCP           Relevant Orders    Ambulatory referral/consult to Internal Medicine       Other    Carpopedal spasm - Primary    Current Assessment & Plan     - benign. Counseled on behavioral strategies during spasms.           Relevant Orders    CBC auto differential (Completed)    COMPREHENSIVE METABOLIC PANEL (Completed)    Magnesium (Completed)    Phosphorus (Completed)          Kolton Adair MD    Patient  note was created using MModal Dictation.  Any errors in syntax or even information may not have been identified and edited on initial review prior to signing this note.  Subjective:        HPI:   Mr. Kei Sharma is a 32 y.o. male with past medical history of bulimia and scoliosis presents for evaluation and management of spasms.    Chief complaint; spasms. Onset 03/2021. 3 events with last episode occurring in December. Episodes last 5-10 minutes. Location is bilateral hands and face. Hand cramping and facial cramping lasts 1 minute. Associated with facial and bilateral hand numbness. Hasn't had episodes since 12/2021. Occurs during high stress/excitement. Notes during 2 of the events having 4-5 alcoholic beverages as well as moderate stress from life/work (pet was hospitalized during one event, the other the patient had significant work stress. Works as an . No LOC during events. Denies trauma to head or hands. Describes himself as a high anxiety person.       PAST MEDICAL HISTORY:  History reviewed. No pertinent past medical history.    PAST SURGICAL HISTORY:  History reviewed. No pertinent surgical history.    CURRENT MEDS:  No current outpatient medications on file.     Current Facility-Administered Medications   Medication Dose Route Frequency Provider Last Rate Last Admin    INV TVO751/placebo Injection 1 Dose  1 Dose Intramuscular 1 time in Clinic/HOD Magui Peterson MD        INV TVE847/placebo Injection 1 Dose  1 Dose Intramuscular 1 time in Clinic/HOD Magui Peterson MD        INV [id number] vaccine 1 Dose  1 Dose Intramuscular 1 time in Clinic/HOD Magui Peterson MD           ALLERGIES:  Review of patient's allergies indicates:  No Known Allergies    FAMILY HISTORY:  Family History   Problem Relation Age of Onset    No Known Problems Mother     No Known Problems Father        SOCIAL HISTORY:  Social History     Tobacco Use    Smoking status: Never Smoker  "   Smokeless tobacco: Never Used   Substance Use Topics    Alcohol use: Yes       Review of Systems:  12 system review of systems is negative except for the symptoms mentioned in HPI.      Objective:     Vitals:    02/25/22 1505   BP: (!) 137/94   BP Location: Right arm   Patient Position: Sitting   BP Method: Large (Automatic)   Pulse: 105   Weight: 53.4 kg (117 lb 11.6 oz)   Height: 5' 6" (1.676 m)     General: NAD, well nourished   Eyes: no tearing, discharge, no erythema   ENT: moist mucous membranes of the oral cavity, nares patent    Neck: Supple, full range of motion  Cardiovascular: Warm and well perfused, pulses equal and symmetrical  Lungs: Normal work of breathing, normal chest wall excursions  Skin: No rash, lesions, or breakdown on exposed skin  Psychiatry: Mood and affect are appropriate   Abdomen: soft, non tender, non distended  Extremities: No cyanosis, clubbing or edema.    Neurological   MENTAL STATUS: Alert and oriented to person, place, and time. Attention and concentration within normal limits. Speech without dysarthria, able to name and repeat without difficulty. Recent and remote memory within normal limits   CRANIAL NERVES: Visual fields intact. PERRL. EOMI. Facial sensation intact. Face symmetrical. Hearing grossly intact. Full shoulder shrug bilaterally. Tongue protrudes midline   SENSORY: Sensation is intact to light touch throughout.  Joint position perception intact. Negative Romberg.   MOTOR: Normal bulk and tone. No pronator drift. 5/5 deltoid, biceps, triceps, interosseous, hand  bilaterally. 5/5 iliopsoas, knee extension/flexion, foot dorsi/plantarflexion bilaterally.    REFLEXES: Symmetric and 3+ throughout.    CEREBELLAR/COORDINATION/GAIT: Gait steady with normal arm swing and stride length.  Heel to shin intact. Finger to nose intact. Normal rapid alternating movements. Mild tremor with outstretched bilateral extremities.  "

## 2022-03-17 ENCOUNTER — PATIENT MESSAGE (OUTPATIENT)
Dept: NEUROLOGY | Facility: CLINIC | Age: 32
End: 2022-03-17
Payer: COMMERCIAL

## 2022-04-01 ENCOUNTER — RESEARCH ENCOUNTER (OUTPATIENT)
Dept: RESEARCH | Facility: HOSPITAL | Age: 32
End: 2022-04-01
Payer: COMMERCIAL

## 2022-04-01 NOTE — PROGRESS NOTES
Study title: A Phase 1/2/3. Placebo Controlled, Randomized, Observer-Blind, Dose-Finding Study to Describe the Safety, Tolerability, Immunogenicity and Potential Efficacy of SARS-COV-2 RNA Vaccine Candidates Against COVID-19 in Healthy Adults   IRB #: 2020.198  Sponsor: [employer]   Sponsor's Protocol: [id number]  Site Number: [id number]  Subject ID: [id number]      Kei Sharma was contacted via telephone call on 4/1/2022 for their Visit 503, 6 month follow up. The following information was collected from him         Has the patient had any prohibited medications since their last visit? no   Has the patient had any major changes in health since their last visit (JANINE)? no   Has the patient had any non-study vaccinations since their last visit? no        Kei Sharma was informed that their next visit will be an in person visit. Subject instructed to continue completing their weekly Illness Diary and contact the site staff or investigator if a medically attend event or hospitalization occurs.

## 2022-04-18 ENCOUNTER — OFFICE VISIT (OUTPATIENT)
Dept: PSYCHIATRY | Facility: CLINIC | Age: 32
End: 2022-04-18
Payer: COMMERCIAL

## 2022-04-18 DIAGNOSIS — F41.1 ANXIETY, GENERALIZED: Primary | ICD-10-CM

## 2022-04-18 PROCEDURE — 90792 PR PSYCHIATRIC DIAGNOSTIC EVALUATION W/MEDICAL SERVICES: ICD-10-PCS | Mod: 95,,, | Performed by: NURSE PRACTITIONER

## 2022-04-18 PROCEDURE — 90792 PSYCH DIAG EVAL W/MED SRVCS: CPT | Mod: 95,,, | Performed by: NURSE PRACTITIONER

## 2022-04-18 NOTE — PROGRESS NOTES
"4/18/2022 3:10 PM   Kei Sharma   1990   [id number]           OUTPATIENT PSYCHIATRY INITIAL EVALUATION NOTE      Kei Sharma, a 32 y.o. male, presenting for initial evaluation visit. Met with patient.    Reason for Encounter: self-referral. 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 Patient reports a few months ago was having neurological issues upon stressful events happening, had been seen by neurology and dx'd with carpopedal spasm exacerbated by respiratory alkalosis and was then recommended to follow up with psychiatry to manage stress and for anxiety    Reports will get high anxiety "unnecessary stress" Reports feeling constant pressure of trying to get "everything straight" and "me shutting down sometimes."     "I feel like I need the most help not thinking about everything all at once." Reports "I need to tune into one thing to help with focus and have to hold on as hard."    "I don't like the idea of having to be in cram mode all the time." "I was a good student on paper and it doesn't mean that I have good habits."    During busier moments, start to shut down and "every little thing is the worse thing that's happened."    Trouble getting to sleep because "I just don't like sleeping that much."    Discussed to complete ADHD scale at next visit on 4/27 and continue further evaluation due to time constraints        Psychosocial stressors:    Psychiatric Review Of Systems - Is patient experiencing or having changes in:    Symptoms of Depression: Reports change in sleep, change in appetite and diminished concentration or cognition or indecisiveness Denies diminished mood or loss of interest/anhedonia, irritability, diminished energy, excessive guilt or hopelessness or worthlessness and suicidal ideations -  Denies Passive/Active SI  Onset was approximately several years " "ago. Symptoms have been gradually worsening since that time.      Symptoms of MARLEY: Reports excessive anxiety/worry/fear, about numerous issues, difficult to control, with restlessness, fatigue, poor concentration, irritability, muscle tension, sleep disturbance and causes functionally impairing distress Denies no anxiety symptoms  Onset was approximately several months ago. Symptoms have been gradually worsening since that time.      Symptoms of barron or hypomania: No elevated, expansive, or irritable mood with increased energy or activity; with inflated self-esteem or grandiosity, decreased need for sleep, increased rate of speech,  racing thoughts, distractibility, increased goal directed activity or PMA, risky/disinhibited behavior      Symptoms of psychosis: No hallucinations, delusions, disorganized thinking, disorganized behavior or abnormal motor behavior, or negative symptoms (diminshed emotional expression, avolition, anhedonia, alogia, asociality       Sleep: 5 hours of sleep per night. "goal-oriented" some kind of pet project to get done, "I feel like if I'm sleeping I'm not getting things done."    Risk Parameters:  Patient reports no suicidal ideation  Patient reports no homicidal ideation  Patient reports no self-injurious behavior  Patient reports no violent behavior    Other symptoms    Symptoms of Panic Disorder: No recurrent panic attacks, precipitated or un-precipitated, source of worry and/or behavioral changes secondary; with or without agoraphobia.     Have had a panic attack before, "massive panic attack to the point where I had to cancel the flight." "Little after Ce" cat was hit by a car, dog came down with pancreatis, "whole lot of stress to the point of being so overwhelmed."    Symptoms of PTSD: No h/o trauma; re-experiencing/intrusive symptoms, avoidant behavior, negative alterations in cognition or mood, or hyperarousal symptoms; with or without dissociative symptoms.     Sudden death " "of father, "was particularly traumatic" in 2015.     Symptoms of OCD: No obsessions, compulsions or ruminations. Reports have had issues with "thinking about death a lot and getting really stressed out from that." "but not to the point of being suicidal." "stuck in loops of what's the last thing you've ever gonna eat or last song I'm gonna hear."     Symptoms of Eating Disorders: No anorexia, bulimia or binging. Reports "bulimia at one point a few years ago."    Symptoms of ADHD: Reports inattention, difficulty with hyperactivity    MEDICAL REVIEW OF SYSTEMS  History obtained from the patient  1) General : NO chills or fever  2) Eyes: NO  visual changes  3) ENT: NO hearing change, nasal discharge or sore throat  4) Endocrine: NO weight changes or polydipsia/polyuria  5) Dermatological: NO rashes  6) Respiratory: NO cough, shortness of breath  7) Cardiovascular: NO chest pain, palpitations or racing heart  8) Gastrointestinal: NO nausea, vomiting, constipation or diarrhea  9) Musculoskeletal: NO muscle pain or stiffness  10) Neurological: NO confusion, dizziness, headaches or tremors  11) Psychiatric: please see HPI       Substance Use:   Denies      Psychotropic Medication Review:  Previous Trials-  none  Current meds-    Previous Psychiatric Hospitalizations: none    HISTORY         No past medical history on file.      History of Seizures or TBI: denies    No past surgical history on file.    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 "addiction to narcotics."     No family hx of schizophrenia, bipolar disorder, anxiety, depression. Reports substance abuse.     Social History:  Born and raised in [place], bachelor's degrees and chemistry and electrical engineering currently. Work full-time, work as , "current job for about year" worked as an  since 2019. "gifted program in grammar school at some point."    Very hot and cold childhood growing up, ultimately good, good friends, home life, parents had contentious relationship, not a traumatic childhood.     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 Reports "rely a lot on others to get work done so this gets staggers and this tough sometimes." Sometimes the work is "a lot of things coming at once and kind of freak out sometimes." Enjoy coworkers, and "very much do enjoy the job." Engaged, been with current partner for 12 years. No children. Live in same house, father's home, inherited when passed away. Kirstiekwesi manages accounts of several restaurants in  and .     Jeanine "is only person I feel comfortable talking to." Group of friends on and offline, college. Enjoy swimming, paddleboarding, video games, volunteer to help counter extremism, like to research a lot of    1 to 2 drinks per night. No THC or other rec. Drug use per report. No caffeine regularly. Stay "fit for the most part." Swim or run daily if possible. No . No prior alf time or arrests. Only child, step sibling "I don't talk to." Vaping nicotine bu tnot currently      Guns or other weapons in the home:      OBJECTIVE       Constitutional  Vitals:  Most recent vital signs, dated less than 90 days prior to this appointment, were reviewed.    There were no vitals filed for this visit.         Laboratory Data: Reviewed most recent     Medications:  No outpatient encounter medications on file as of 4/18/2022.     Facility-Administered Encounter Medications as " "of 4/18/2022   Medication Dose Route Frequency Provider Last Rate Last Admin    INV MYX487/placebo Injection 1 Dose  1 Dose Intramuscular 1 time in Clinic/HOD Magui Peterson MD        INV [id number]/placebo Injection 1 Dose  1 Dose Intramuscular 1 time in Clinic/HOD Magui Peterson MD        INV [id number] vaccine 1 Dose  1 Dose Intramuscular 1 time in Clinic/HOD Magui Peterson MD           Allergy:  Review of patient's allergies indicates:  No Known Allergies    Nutritional Screening: Considering the patient's height and weight, medications, medical history and preferences, should a referral be made to the dietitian? no    Review of Systems:  General: unremarkable, age appropriate  Resp:  No shortness of breath, hyperventilation or cough  Cvs:  No tachycardia or chest pain  Gi:  No nausea, vomiting, pain, constipation or diarrhea  Musculoskeletal:  No pain or stiffness of the joints  Muscle Strength/Tone:not examined  Neurological:  No weakness, sensory changes, seizures, confusion, memory loss, tremor or other abnormal movements   Gait & Station:non-ataxic    AIMS:  n/a *    Mental Status Exam:  Appearance: unremarkable, age appropriate, casually dressed  Behavior/Cooperation:appropriate friendly and cooperative   Speech: appropriate rate, volume and tone spontaneous   Language: uses words appropriately; NO aphasia or dysarthria  Mood: "  "  Affect:  full, congruent with mood and appropriate to situation/content.  Thought Process:  normal and logical  Thought Content: normal, no suicidality, no homicidality, delusions, or paranoia  Sensorium:  Awake  Alert and Oriented: x3 grossly intact  Memory: Intact to conversation both recent and remote  Attention/concentration: appropriate for age/education.   Insight: Intact  Judgment:Intact        Strengths and Liabilities: Strength: Patient accepts guidance/feedback, Strength: Patient is expressive/articulate., Strength: Patient is intelligent., Strength: " Patient is motivated for change., Strength: Patient is physically healthy., Strength: Patient has positive support network., Strength: Patient has reasonable judgment., Liability: Patient lacks coping skills.    ASSESSMENT     Impression: Anxiety NOS        Treatment Goals:  Specify outcomes written in observable, behavioral terms:   Anxiety: acquiring relapse prevention skills, reducing negative automatic thoughts, reducing physical symptoms of anxiety and reducing time spent worrying (<30 minutes/day)  Depression: acquiring relapse prevention skills, increasing energy, increasing interest in usual activities, increasing motivation, increasing self-reward for positive behaviors (one/day), increasing self-reward for positive thoughts (one/day), increasing social contacts (three/week), reducing excessive guilt, reducing fatigue and reducing negative automatic thoughts    TREATMENT PLAN     · Medication Management:   · Consider at next visit  · Labs: reviewed most recent labs  · The treatment plan and follow up plan were reviewed with the patient.  · Discussed with patient informed consent, risks vs. benefits, alternative treatments, side effect profile and the inherent unpredictability of individual responses to these treatments. The patient expresses understanding of the above and displays the capacity to agree with this current plan and had no other questions.  · Encouraged Patient to keep future appointments.   · Take medications as prescribed and abstain from substance abuse.   · In the event of an emergency patient was advised to go to the emergency room.  · Referral for further treatment to social work team for psychotherapy    Return to Clinic:  1 month    > than 50% of total time spend on coordination of care and counseling   (which included pts differential diagnosis and prognosis for psychiatric conditions, risks, benefits of treatments, instructions and adherence to treatment plan, risk reduction,  reviewing current psychiatric medication regimen, medical problems and social stressors. In addtion to possible discussion with other healthcare provider/s)    Add on Psychotherapy time: 0  Total Face to face time: 60mins    The patient location is: [place], at home  The chief complaint leading to consultation is: med f/u    Visit type: audiovisual    Face to Face time with patient: 60 min  60 minutes of total time spent on the encounter, which includes face to face time and non-face to face time preparing to see the patient (eg, review of tests), Obtaining and/or reviewing separately obtained history, Documenting clinical information in the electronic or other health record, Independently interpreting results (not separately reported) and communicating results to the patient/family/caregiver, or Care coordination (not separately reported).         Each patient to whom he or she provides medical services by telemedicine is:  (1) informed of the relationship between the physician and patient and the respective role of any other health care provider with respect to management of the patient; and (2) notified that he or she may decline to receive medical services by telemedicine and may withdraw from such care at any time.    Notes:     Kentrell Galvez, MSN, APRN, PMHNP-BC  [facility]   4/18/2022 3:10 PM

## 2022-04-27 ENCOUNTER — PATIENT MESSAGE (OUTPATIENT)
Dept: PSYCHIATRY | Facility: CLINIC | Age: 32
End: 2022-04-27
Payer: COMMERCIAL

## 2022-04-27 ENCOUNTER — OFFICE VISIT (OUTPATIENT)
Dept: PSYCHIATRY | Facility: CLINIC | Age: 32
End: 2022-04-27
Payer: COMMERCIAL

## 2022-04-27 DIAGNOSIS — F90.2 ADHD (ATTENTION DEFICIT HYPERACTIVITY DISORDER), COMBINED TYPE: Primary | ICD-10-CM

## 2022-04-27 DIAGNOSIS — F41.9 ANXIETY: ICD-10-CM

## 2022-04-27 PROCEDURE — 99215 OFFICE O/P EST HI 40 MIN: CPT | Mod: 95,,, | Performed by: NURSE PRACTITIONER

## 2022-04-27 PROCEDURE — 99215 PR OFFICE/OUTPT VISIT, EST, LEVL V, 40-54 MIN: ICD-10-PCS | Mod: 95,,, | Performed by: NURSE PRACTITIONER

## 2022-04-27 NOTE — PROGRESS NOTES
"4/27/2022 11:08 AM   Kei Sharma   1990   [id number]           OUTPATIENT PSYCHIATRY FOLLOW- UP VISIT    Reason for Encounter:  Kei Sharma, a 32 y.o. male,who presents today for follow up of initial visit. Met with patient.    Interval History and Content of Current Session:    Today,  Pt reports today as continuation of previous visit.     Completed WHO ADHD scale this visit which is consistent with ADHD. Reports had been placed in "gifted" courses in childhood, did well academically.     Reports is "ambivalent" towards therapy, "there is nothing I think I need to work through, I don't feel like the trauma or anything."    Patient reports goals would "I would like to slow down my brain a little bit." Reports "would like to focus on too many things at once."    Reports hasn't had muscle spasms lately.       Psychosocial stressors: occupational, family, social pressures      Review Of Systems:     Medical Review Of Systems:  Pertinent items are noted in HPI.    Psychiatric Review Of Systems:  sleep: no  appetite changes: no  weight changes: no  energy/anergy: no  interest/pleasure/anhedonia: no  somatic symptoms: no  libido: no  anxiety/panic: no  guilty/hopeless: no  S.I.B.s/risky behavior: no  any drugs: no  alcohol: no      Sleep: 5 hours of sleep per night, see HPI      Risk Parameters:  Patient reports no suicidal ideation  Patient reports no homicidal ideation  Patient reports no self-injurious behavior  Patient reports no violent behavior      Current meds-    Compliance: NA    Side effects: NA      Psychiatric, social, and family history reviewed this visit      Objective     ALL MEDICATIONS:  No current outpatient medications on file.    Current Facility-Administered Medications:     INV [id number]/placebo Injection 1 Dose, 1 Dose, Intramuscular, 1 time in Clinic/Magui BOLES MD    INV CYA123/placebo Injection 1 Dose, 1 Dose, Intramuscular, 1 time in Clinic/HODMagui " MD Nicholas    INV [id number] vaccine 1 Dose, 1 Dose, Intramuscular, 1 time in Clinic/HOD, Magui Peterson MD    ALLERGIES:  Review of patient's allergies indicates:  No Known Allergies    RELEVANT LABS/STUDIES:    Lab Results   Component Value Date    WBC 7.76 02/25/2022    HGB 14.9 02/25/2022    HCT 44.1 02/25/2022    MCV 95 02/25/2022     02/25/2022     BMP  Lab Results   Component Value Date     02/25/2022    K 4.2 02/25/2022    CL 96 02/25/2022    CO2 33 (H) 02/25/2022    BUN 14 02/25/2022    CREATININE 1.3 02/25/2022    CALCIUM 10.1 02/25/2022    ANIONGAP 10 02/25/2022    ESTGFRAFRICA >60.0 02/25/2022    EGFRNONAA >60.0 02/25/2022     Lab Results   Component Value Date    ALT 50 (H) 02/25/2022    AST 65 (H) 02/25/2022    ALKPHOS 41 (L) 02/25/2022    BILITOT 1.5 (H) 02/25/2022     No results found for: TSH  No results found for: LABA1C, HGBA1C    Constitutional  Vitals:  Most recent vital signs, dated less than 90 days prior to this appointment, were reviewed.    There were no vitals filed for this visit.         PHYSICAL EXAM  General: well developed, well nourished  Neurologic:   Gait: Normal   Psychomotor signs:  No involuntary movements or tremor  AIMS: none    PSYCHIATRIC EXAM:     Mental Status Exam:  Appearance: unremarkable, age appropriate  Behavior/Cooperation: normal, cooperative  Speech: normal tone, normal rate, normal pitch, normal volume  Language: uses words appropriately; NO aphasia or dysarthria  Mood: steady  Affect: full, congruent with mood and appropriate to content/situation  Thought Process: normal and logical  Thought Content: normal, no suicidality, no homicidality, delusions, or paranoia  Level of Consciousness: Alert and Oriented x3  Memory:  Intact  Attention/concentration: appropriate for age/education.   Fund of Knowledge: appears adequate  Insight:  Intact  Judgment: Intact     Assessment and Diagnosis   Status/Progress: Based on the examination completed  last visit and today patient presents with symptoms consistent with ADHD. He is academically gifted and appears intelligent so these symptoms are likely to have been unrecognized. He has some difficulty with anxiety and obsessive thinking and some perfectionistic tendencies.     General Impression:   ADHD, combined type  Anxiety, NOS      Intervention/Counseling/Treatment Plan   · Medication Management: -        Start Vyvanse 30 mg by mouth once daily pending UDS, vital signs and C/S form completion  · Labs: reviewed most recent  · The treatment plan and follow up plan were reviewed with the patient.  · Discussed with patient informed consent, risks vs. benefits, alternative treatments, side effect profile and the inherent unpredictability of individual responses to these treatments. The patient expresses understanding of the above and displays the capacity to agree with this current plan and had no other questions.  · Encouraged Patient to keep future appointments.   · Take medications as prescribed and abstain from substance abuse.   · In the event of an emergency patient was advised to go to the emergency room.  · Recommend psychotherapy this visit    Return to Clinic: 2 weeks, 1 month    > than 50% of total time spend on coordination of care and counseling   (which included pts differential diagnosis and prognosis for psychiatric conditions, risks, benefits of treatments, instructions and adherence to treatment plan, risk reduction, reviewing current psychiatric medication regimen, medical problems and social stressors. In addtion to possible discussion with other healthcare provider/s)    Add on Psychotherapy time:0  Total Face time: 48 min    The patient location is: [place], at home  The chief complaint leading to consultation is: f/u of initial visit    Visit type: audiovisual    Face to Face time with patient: 45 min  45 minutes of total time spent on the encounter, which includes face to face time and  non-face to face time preparing to see the patient (eg, review of tests), Obtaining and/or reviewing separately obtained history, Documenting clinical information in the electronic or other health record, Independently interpreting results (not separately reported) and communicating results to the patient/family/caregiver, or Care coordination (not separately reported).         Each patient to whom he or she provides medical services by telemedicine is:  (1) informed of the relationship between the physician and patient and the respective role of any other health care provider with respect to management of the patient; and (2) notified that he or she may decline to receive medical services by telemedicine and may withdraw from such care at any time.    Notes:     Kentrell Galvez, MSN, APRN, PMHNP-BC  [facility]   4/27/2022 11:08 AM

## 2022-05-04 ENCOUNTER — CLINICAL SUPPORT (OUTPATIENT)
Dept: PSYCHIATRY | Facility: CLINIC | Age: 32
End: 2022-05-04
Payer: COMMERCIAL

## 2022-05-04 VITALS
BODY MASS INDEX: 19.19 KG/M2 | HEART RATE: 81 BPM | HEIGHT: 66 IN | WEIGHT: 119.38 LBS | SYSTOLIC BLOOD PRESSURE: 136 MMHG | DIASTOLIC BLOOD PRESSURE: 97 MMHG

## 2022-05-04 DIAGNOSIS — F90.2 ADHD (ATTENTION DEFICIT HYPERACTIVITY DISORDER), COMBINED TYPE: Primary | ICD-10-CM

## 2022-05-04 LAB
AMPHET+METHAMPHET UR QL: NEGATIVE
BARBITURATES UR QL SCN>200 NG/ML: NEGATIVE
BENZODIAZ UR QL SCN>200 NG/ML: NEGATIVE
BZE UR QL SCN: NEGATIVE
CANNABINOIDS UR QL SCN: NEGATIVE
CREAT UR-MCNC: 138 MG/DL (ref 23–375)
ETHANOL UR-MCNC: <10 MG/DL
METHADONE UR QL SCN>300 NG/ML: NEGATIVE
OPIATES UR QL SCN: NEGATIVE
PCP UR QL SCN>25 NG/ML: NEGATIVE
TOXICOLOGY INFORMATION: NORMAL

## 2022-05-04 PROCEDURE — 80307 DRUG TEST PRSMV CHEM ANLYZR: CPT | Performed by: NURSE PRACTITIONER

## 2022-05-04 PROCEDURE — 99999 PR PBB SHADOW E&M-EST. PATIENT-LVL II: CPT | Mod: PBBFAC,,,

## 2022-05-04 PROCEDURE — 99999 PR PBB SHADOW E&M-EST. PATIENT-LVL II: ICD-10-PCS | Mod: PBBFAC,,,

## 2022-05-06 DIAGNOSIS — F90.0 ADHD (ATTENTION DEFICIT HYPERACTIVITY DISORDER), INATTENTIVE TYPE: Primary | ICD-10-CM

## 2022-05-06 RX ORDER — LISDEXAMFETAMINE DIMESYLATE 30 MG/1
30 CAPSULE ORAL EVERY MORNING
Qty: 30 CAPSULE | Refills: 0 | Status: SHIPPED | OUTPATIENT
Start: 2022-05-06 | End: 2022-09-20

## 2022-05-25 ENCOUNTER — OFFICE VISIT (OUTPATIENT)
Dept: PSYCHIATRY | Facility: CLINIC | Age: 32
End: 2022-05-25
Payer: COMMERCIAL

## 2022-05-25 DIAGNOSIS — F90.2 ADHD (ATTENTION DEFICIT HYPERACTIVITY DISORDER), COMBINED TYPE: Primary | ICD-10-CM

## 2022-05-25 DIAGNOSIS — F41.9 ANXIETY: ICD-10-CM

## 2022-05-25 PROCEDURE — 99214 OFFICE O/P EST MOD 30 MIN: CPT | Mod: 95,,, | Performed by: NURSE PRACTITIONER

## 2022-05-25 PROCEDURE — 99214 PR OFFICE/OUTPT VISIT, EST, LEVL IV, 30-39 MIN: ICD-10-PCS | Mod: 95,,, | Performed by: NURSE PRACTITIONER

## 2022-05-25 RX ORDER — LISDEXAMFETAMINE DIMESYLATE 30 MG/1
30 CAPSULE ORAL EVERY MORNING
Qty: 30 CAPSULE | Refills: 0 | Status: SHIPPED | OUTPATIENT
Start: 2022-05-25 | End: 2022-09-20

## 2022-05-25 NOTE — PROGRESS NOTES
"5/25/2022 2:00 PM   Kei Sharma   1990   [id number]                                                                   OUTPATIENT PSYCHIATRY FOLLOW- UP VISIT     Reason for Encounter:  Kei Sharma, a 32 y.o. male,who presents today for follow up of initial visit. Met with patient.     Interval History and Content of Current Session:     Today,  Pt reports today as a follow up from previous visit about 1 month prior. Reports since starting medication, reports overall helpful - improving focus, reducing distractibility, reduced anxiety.     Reports has noticed less moments of feeling overwhelmed and stressed, but "not panicky." Reports "don't get up for breaks as much." and "bit more disciplined."    Lasting through out the work day from am until the pm. Reports taking during the work week but recently during the weekend. No changes in appetite. Reports "large wave of tiredness come over me" in the afternoon after work.     Checked BP at 142/89 which is about consistent with prior BP completed before 138/92. Discussed checking at home as well.     Denies SI/HI, AVH, racing thoughts, mood lability     Psychosocial stressors: occupational, family, social pressures        Review Of Systems:      Medical Review Of Systems:  Pertinent items are noted in HPI.     Psychiatric Review Of Systems:  sleep: yes, but intermittent, 6 to 7 hours of sleep nightly  appetite changes: no  weight changes: no  energy/anergy: no  interest/pleasure/anhedonia: no  somatic symptoms: no  libido: no  anxiety/panic: no  guilty/hopeless: no  S.I.B.s/risky behavior: no  any drugs: no  alcohol: no       Sleep: 6 to 7 hours of sleep per night, see HPI        Risk Parameters:  Patient reports no suicidal ideation  Patient reports no homicidal ideation  Patient reports no self-injurious behavior  Patient reports no violent behavior        Current meds-     Compliance: NA     Side effects: mild tiredness/fatigue in the " "afternoons        Psychiatric, social, and family history reviewed this visit        Objective      ALL MEDICATIONS:  No current outpatient medications on file.     Current Facility-Administered Medications:     INV [id number]/placebo Injection 1 Dose, 1 Dose, Intramuscular, 1 time in Clinic/HOD, Magui Peterson MD    INV [id number]/placebo Injection 1 Dose, 1 Dose, Intramuscular, 1 time in Clinic/HOD, Magui Peterson MD    INV [id number] vaccine 1 Dose, 1 Dose, Intramuscular, 1 time in Clinic/HOD, Magui Peterson MD     ALLERGIES:  Review of patient's allergies indicates:  No Known Allergies     RELEVANT LABS/STUDIES:          Lab Results   Component Value Date     WBC 7.76 02/25/2022     HGB 14.9 02/25/2022     HCT 44.1 02/25/2022     MCV 95 02/25/2022      02/25/2022      BMP        Lab Results   Component Value Date      02/25/2022     K 4.2 02/25/2022     CL 96 02/25/2022     CO2 33 (H) 02/25/2022     BUN 14 02/25/2022     CREATININE 1.3 02/25/2022     CALCIUM 10.1 02/25/2022     ANIONGAP 10 02/25/2022     ESTGFRAFRICA >60.0 02/25/2022     EGFRNONAA >60.0 02/25/2022            Lab Results   Component Value Date     ALT 50 (H) 02/25/2022     AST 65 (H) 02/25/2022     ALKPHOS 41 (L) 02/25/2022     BILITOT 1.5 (H) 02/25/2022      No results found for: TSH  No results found for: LABA1C, HGBA1C     Constitutional  Vitals:  Most recent vital signs, dated less than 90 days prior to this appointment, were reviewed.    There were no vitals filed for this visit.            PHYSICAL EXAM  General: well developed, well nourished  Neurologic:   Gait: Normal   Psychomotor signs:  No involuntary movements or tremor  AIMS: none     PSYCHIATRIC EXAM:     Mental Status Exam:  Appearance: unremarkable, age appropriate  Behavior/Cooperation: normal, cooperative  Speech: normal tone, normal rate, normal pitch, normal volume  Language: uses words appropriately; NO aphasia or dysarthria  Mood: "I feel " "fine"  Affect: full, congruent with mood and appropriate to content/situation  Thought Process: normal and logical  Thought Content: normal, no suicidality, no homicidality, delusions, or paranoia  Level of Consciousness: Alert and Oriented x3  Memory:  Intact  Attention/concentration: appropriate for age/education.   Fund of Knowledge: appears adequate  Insight:  Intact  Judgment: Intact      Assessment and Diagnosis   Status/Progress: Based on the examination completed last visit and today patient presents with symptoms consistent with ADHD. He is academically gifted and appears intelligent so these symptoms are likely to have been unrecognized. He has some difficulty with anxiety and obsessive thinking and some perfectionistic tendencies.      General Impression:   ADHD, combined type  Anxiety, NOS        Intervention/Counseling/Treatment Plan   · Medication Management: -        Continue Vyvanse 30 mg by mouth once daily  · Labs: reviewed most recent  · The treatment plan and follow up plan were reviewed with the patient.  · Discussed with patient informed consent, risks vs. benefits, alternative treatments, side effect profile and the inherent unpredictability of individual responses to these treatments. The patient expresses understanding of the above and displays the capacity to agree with this current plan and had no other questions.  · Encouraged Patient to keep future appointments.   · Take medications as prescribed and abstain from substance abuse.   · In the event of an emergency patient was advised to go to the emergency room.  · Recommend psychotherapy this visit     Return to Clinic: 1 to 3 months     > than 50% of total time spend on coordination of care and counseling   (which included pts differential diagnosis and prognosis for psychiatric conditions, risks, benefits of treatments, instructions and adherence to treatment plan, risk reduction, reviewing current psychiatric medication regimen, " medical problems and social stressors. In addtion to possible discussion with other healthcare provider/s)     Add on Psychotherapy time:0  Total Face time: 20 min     The patient location is: [place], at home  The chief complaint leading to consultation is: f/u of initial visit     Visit type: audiovisual     Face to Face time with patient: 20 min  25 minutes of total time spent on the encounter, which includes face to face time and non-face to face time preparing to see the patient (eg, review of tests), Obtaining and/or reviewing separately obtained history, Documenting clinical information in the electronic or other health record, Independently interpreting results (not separately reported) and communicating results to the patient/family/caregiver, or Care coordination (not separately reported).            Each patient to whom he or she provides medical services by telemedicine is:  (1) informed of the relationship between the physician and patient and the respective role of any other health care provider with respect to management of the patient; and (2) notified that he or she may decline to receive medical services by telemedicine and may withdraw from such care at any time.     Notes:      Kentrell Galvez, MSN, APRN, PMHNP-BC  [facility]   5/25/2022 2:00 PM

## 2022-08-09 ENCOUNTER — OFFICE VISIT (OUTPATIENT)
Dept: PRIMARY CARE CLINIC | Facility: CLINIC | Age: 32
End: 2022-08-09
Payer: COMMERCIAL

## 2022-08-09 VITALS
TEMPERATURE: 98 F | DIASTOLIC BLOOD PRESSURE: 82 MMHG | HEART RATE: 64 BPM | WEIGHT: 122.13 LBS | BODY MASS INDEX: 19.63 KG/M2 | HEIGHT: 66 IN | SYSTOLIC BLOOD PRESSURE: 120 MMHG | OXYGEN SATURATION: 98 %

## 2022-08-09 DIAGNOSIS — Z00.00 WELL ADULT EXAM: Primary | ICD-10-CM

## 2022-08-09 DIAGNOSIS — Z23 NEED FOR TDAP VACCINATION: ICD-10-CM

## 2022-08-09 DIAGNOSIS — Z12.83 SKIN CANCER SCREENING: ICD-10-CM

## 2022-08-09 DIAGNOSIS — F98.8 ATTENTION DEFICIT DISORDER, UNSPECIFIED HYPERACTIVITY PRESENCE: ICD-10-CM

## 2022-08-09 DIAGNOSIS — Z11.3 SCREEN FOR STD (SEXUALLY TRANSMITTED DISEASE): ICD-10-CM

## 2022-08-09 PROCEDURE — 3079F PR MOST RECENT DIASTOLIC BLOOD PRESSURE 80-89 MM HG: ICD-10-PCS | Mod: CPTII,S$GLB,, | Performed by: FAMILY MEDICINE

## 2022-08-09 PROCEDURE — 99999 PR PBB SHADOW E&M-EST. PATIENT-LVL V: CPT | Mod: PBBFAC,,, | Performed by: FAMILY MEDICINE

## 2022-08-09 PROCEDURE — 3008F PR BODY MASS INDEX (BMI) DOCUMENTED: ICD-10-PCS | Mod: CPTII,S$GLB,, | Performed by: FAMILY MEDICINE

## 2022-08-09 PROCEDURE — 99395 PR PREVENTIVE VISIT,EST,18-39: ICD-10-PCS | Mod: S$GLB,,, | Performed by: FAMILY MEDICINE

## 2022-08-09 PROCEDURE — 3008F BODY MASS INDEX DOCD: CPT | Mod: CPTII,S$GLB,, | Performed by: FAMILY MEDICINE

## 2022-08-09 PROCEDURE — 1159F MED LIST DOCD IN RCRD: CPT | Mod: CPTII,S$GLB,, | Performed by: FAMILY MEDICINE

## 2022-08-09 PROCEDURE — 99395 PREV VISIT EST AGE 18-39: CPT | Mod: S$GLB,,, | Performed by: FAMILY MEDICINE

## 2022-08-09 PROCEDURE — 99999 PR PBB SHADOW E&M-EST. PATIENT-LVL V: ICD-10-PCS | Mod: PBBFAC,,, | Performed by: FAMILY MEDICINE

## 2022-08-09 PROCEDURE — 1160F PR REVIEW ALL MEDS BY PRESCRIBER/CLIN PHARMACIST DOCUMENTED: ICD-10-PCS | Mod: CPTII,S$GLB,, | Performed by: FAMILY MEDICINE

## 2022-08-09 PROCEDURE — 3079F DIAST BP 80-89 MM HG: CPT | Mod: CPTII,S$GLB,, | Performed by: FAMILY MEDICINE

## 2022-08-09 PROCEDURE — 3074F SYST BP LT 130 MM HG: CPT | Mod: CPTII,S$GLB,, | Performed by: FAMILY MEDICINE

## 2022-08-09 PROCEDURE — 1159F PR MEDICATION LIST DOCUMENTED IN MEDICAL RECORD: ICD-10-PCS | Mod: CPTII,S$GLB,, | Performed by: FAMILY MEDICINE

## 2022-08-09 PROCEDURE — 1160F RVW MEDS BY RX/DR IN RCRD: CPT | Mod: CPTII,S$GLB,, | Performed by: FAMILY MEDICINE

## 2022-08-09 PROCEDURE — 3074F PR MOST RECENT SYSTOLIC BLOOD PRESSURE < 130 MM HG: ICD-10-PCS | Mod: CPTII,S$GLB,, | Performed by: FAMILY MEDICINE

## 2022-08-10 PROBLEM — Z23 NEED FOR TDAP VACCINATION: Status: ACTIVE | Noted: 2022-08-10

## 2022-08-10 PROBLEM — F98.8 ATTENTION DEFICIT DISORDER: Status: ACTIVE | Noted: 2022-08-10

## 2022-08-10 NOTE — PROGRESS NOTES
"/82   Pulse 64   Temp 98.1 °F (36.7 °C) (Oral)   Ht 5' 6" (1.676 m)   Wt 55.4 kg (122 lb 2.2 oz)   SpO2 98%   BMI 19.71 kg/m²     Chief Complaint: Annual Exam and Immunizations      HPI  Kei Sharma is a 32 y.o. male here for    Annual Wellness Exam.  Health maintenance reviewed with pt in detail inc screening studies such as lab studies and vaccines    Patient queried and denies any further complaints    SURGICAL AND MEDICAL HISTORY: updated and reviewed.  ALLERGIES updated and reviewed.  Review of patient's allergies indicates:  No Known Allergies  CURRENT OUTPATIENT MEDICATIONS updated and reviewed    Current Outpatient Medications:     lisdexamfetamine (VYVANSE) 30 MG capsule, Take 1 capsule (30 mg total) by mouth every morning., Disp: 30 capsule, Rfl: 0    lisdexamfetamine (VYVANSE) 30 MG capsule, Take 1 capsule (30 mg total) by mouth every morning., Disp: 30 capsule, Rfl: 0    diphth,pertus,acell,,tetanus (BOOSTRIX TDAP) 2.5-8-5 Lf-mcg-Lf/0.5mL Syrg injection, Inject into the muscle., Disp: 0.5 mL, Rfl: 0  No current facility-administered medications for this visit.    Review of Systems   Constitutional: Negative for activity change, appetite change, chills, diaphoresis, fatigue, fever and unexpected weight change.   HENT: Negative for congestion, ear discharge, ear pain, facial swelling, hearing loss, nosebleeds, postnasal drip, rhinorrhea, sinus pressure, sneezing, sore throat, tinnitus, trouble swallowing and voice change.    Eyes: Negative for photophobia, pain, discharge, redness, itching and visual disturbance.   Respiratory: Negative for cough, chest tightness, shortness of breath and wheezing.    Cardiovascular: Negative for chest pain, palpitations and leg swelling.   Gastrointestinal: Negative for abdominal distention, abdominal pain, anal bleeding, blood in stool, constipation, diarrhea, nausea, rectal pain and vomiting.   Endocrine: Negative for cold intolerance, heat " "intolerance, polydipsia, polyphagia and polyuria.   Genitourinary: Negative for difficulty urinating, dysuria and flank pain.   Musculoskeletal: Negative for arthralgias, back pain, joint swelling, myalgias and neck pain.   Skin: Negative for rash.   Neurological: Negative for dizziness, tremors, seizures, syncope, speech difficulty, weakness, light-headedness, numbness and headaches.   Psychiatric/Behavioral: Negative for behavioral problems, confusion, decreased concentration, dysphoric mood, sleep disturbance and suicidal ideas. The patient is not nervous/anxious and is not hyperactive.        /82   Pulse 64   Temp 98.1 °F (36.7 °C) (Oral)   Ht 5' 6" (1.676 m)   Wt 55.4 kg (122 lb 2.2 oz)   SpO2 98%   BMI 19.71 kg/m²   Physical Exam  Vitals and nursing note reviewed.   Constitutional:       General: He is not in acute distress.     Appearance: Normal appearance. He is well-developed. He is not ill-appearing or toxic-appearing.   HENT:      Head: Normocephalic and atraumatic.      Right Ear: Tympanic membrane, ear canal and external ear normal.      Left Ear: Tympanic membrane, ear canal and external ear normal.      Nose: Nose normal.      Mouth/Throat:      Lips: Pink.      Mouth: Mucous membranes are moist.      Pharynx: No oropharyngeal exudate or posterior oropharyngeal erythema.   Eyes:      General: No scleral icterus.        Right eye: No discharge.         Left eye: No discharge.      Extraocular Movements: Extraocular movements intact.      Conjunctiva/sclera: Conjunctivae normal.   Neck:      Vascular: No carotid bruit.   Cardiovascular:      Rate and Rhythm: Normal rate and regular rhythm.      Pulses: Normal pulses.      Heart sounds: Normal heart sounds. No murmur heard.  Pulmonary:      Effort: Pulmonary effort is normal. No respiratory distress.      Breath sounds: Normal breath sounds. No wheezing or rales.   Abdominal:      General: Bowel sounds are normal. There is no distension.     "  Palpations: Abdomen is soft. There is no mass.      Tenderness: There is no abdominal tenderness. There is no right CVA tenderness, left CVA tenderness, guarding or rebound.      Hernia: No hernia is present.   Musculoskeletal:      Cervical back: Normal range of motion and neck supple. No rigidity or tenderness.   Lymphadenopathy:      Cervical: No cervical adenopathy.   Skin:     General: Skin is warm and dry.   Neurological:      General: No focal deficit present.      Mental Status: He is alert. Mental status is at baseline.   Psychiatric:         Mood and Affect: Mood normal.         Behavior: Behavior normal. Behavior is cooperative.       Kei was seen today for annual exam and immunizations.    Diagnoses and all orders for this visit:    Well adult exam  -     CBC Without Differential; Future  -     Comprehensive Metabolic Panel; Future  -     Lipid Panel; Future  -     TSH; Future  -     Hemoglobin A1C; Future    Screen for STD (sexually transmitted disease)  -     HEPATITIS B SURFACE ANTIBODY; Future  -     Varicella zoster antibody, IgG; Future  -     HIV 1/2 Ag/Ab (4th Gen); Future  -     RPR; Future  -     C. trachomatis/N. gonorrhoeae by AMP DNA; Future  -     Hepatitis A Antibody, IgM; Future  -     Hepatitis B Core Antibody, IgM; Future  -     Hepatitis C Antibody; Future  -     Hepatitis E IgM Antibody; Future  -     Hepatitis B Surface Antigen; Future  -     HERPES SIMPLEX 1 & 2 IGM; Future    Skin cancer screening  -     Ambulatory referral/consult to Dermatology; Future    Attention deficit disorder, unspecified hyperactivity presence    Need for Tdap vaccination      No follow-ups on file.        Harley Romero MD        ===========================================

## 2022-08-12 ENCOUNTER — LAB VISIT (OUTPATIENT)
Dept: LAB | Facility: HOSPITAL | Age: 32
End: 2022-08-12
Attending: FAMILY MEDICINE
Payer: COMMERCIAL

## 2022-08-12 DIAGNOSIS — Z11.3 SCREEN FOR STD (SEXUALLY TRANSMITTED DISEASE): ICD-10-CM

## 2022-08-12 DIAGNOSIS — Z00.00 WELL ADULT EXAM: ICD-10-CM

## 2022-08-12 PROCEDURE — 87340 HEPATITIS B SURFACE AG IA: CPT | Performed by: FAMILY MEDICINE

## 2022-08-12 PROCEDURE — 86709 HEPATITIS A IGM ANTIBODY: CPT | Performed by: FAMILY MEDICINE

## 2022-08-12 PROCEDURE — 86694 HERPES SIMPLEX NES ANTBDY: CPT | Performed by: FAMILY MEDICINE

## 2022-08-12 PROCEDURE — 84443 ASSAY THYROID STIM HORMONE: CPT | Performed by: FAMILY MEDICINE

## 2022-08-12 PROCEDURE — 86592 SYPHILIS TEST NON-TREP QUAL: CPT | Performed by: FAMILY MEDICINE

## 2022-08-12 PROCEDURE — 86803 HEPATITIS C AB TEST: CPT | Performed by: FAMILY MEDICINE

## 2022-08-12 PROCEDURE — 86706 HEP B SURFACE ANTIBODY: CPT | Performed by: FAMILY MEDICINE

## 2022-08-12 PROCEDURE — 86787 VARICELLA-ZOSTER ANTIBODY: CPT | Performed by: FAMILY MEDICINE

## 2022-08-12 PROCEDURE — 80061 LIPID PANEL: CPT | Performed by: FAMILY MEDICINE

## 2022-08-12 PROCEDURE — 85027 COMPLETE CBC AUTOMATED: CPT | Performed by: FAMILY MEDICINE

## 2022-08-12 PROCEDURE — 87389 HIV-1 AG W/HIV-1&-2 AB AG IA: CPT | Performed by: FAMILY MEDICINE

## 2022-08-12 PROCEDURE — 80053 COMPREHEN METABOLIC PANEL: CPT | Performed by: FAMILY MEDICINE

## 2022-08-12 PROCEDURE — 83036 HEMOGLOBIN GLYCOSYLATED A1C: CPT | Performed by: FAMILY MEDICINE

## 2022-08-12 PROCEDURE — 86705 HEP B CORE ANTIBODY IGM: CPT | Performed by: FAMILY MEDICINE

## 2022-08-12 PROCEDURE — 86790 VIRUS ANTIBODY NOS: CPT | Performed by: FAMILY MEDICINE

## 2022-08-13 LAB
ALBUMIN SERPL BCP-MCNC: 4 G/DL (ref 3.5–5.2)
ALP SERPL-CCNC: 35 U/L (ref 55–135)
ALT SERPL W/O P-5'-P-CCNC: 26 U/L (ref 10–44)
ANION GAP SERPL CALC-SCNC: 14 MMOL/L (ref 8–16)
AST SERPL-CCNC: 37 U/L (ref 10–40)
BILIRUB SERPL-MCNC: 1 MG/DL (ref 0.1–1)
BUN SERPL-MCNC: 13 MG/DL (ref 6–20)
CALCIUM SERPL-MCNC: 9.5 MG/DL (ref 8.7–10.5)
CHLORIDE SERPL-SCNC: 100 MMOL/L (ref 95–110)
CHOLEST SERPL-MCNC: 146 MG/DL (ref 120–199)
CHOLEST/HDLC SERPL: 2.2 {RATIO} (ref 2–5)
CO2 SERPL-SCNC: 22 MMOL/L (ref 23–29)
CREAT SERPL-MCNC: 1 MG/DL (ref 0.5–1.4)
ERYTHROCYTE [DISTWIDTH] IN BLOOD BY AUTOMATED COUNT: 12.5 % (ref 11.5–14.5)
EST. GFR  (NO RACE VARIABLE): >60 ML/MIN/1.73 M^2
ESTIMATED AVG GLUCOSE: 82 MG/DL (ref 68–131)
GLUCOSE SERPL-MCNC: 73 MG/DL (ref 70–110)
HBA1C MFR BLD: 4.5 % (ref 4–5.6)
HCT VFR BLD AUTO: 37.5 % (ref 40–54)
HDLC SERPL-MCNC: 66 MG/DL (ref 40–75)
HDLC SERPL: 45.2 % (ref 20–50)
HGB BLD-MCNC: 13.2 G/DL (ref 14–18)
LDLC SERPL CALC-MCNC: 58.8 MG/DL (ref 63–159)
MCH RBC QN AUTO: 32.3 PG (ref 27–31)
MCHC RBC AUTO-ENTMCNC: 35.2 G/DL (ref 32–36)
MCV RBC AUTO: 92 FL (ref 82–98)
NONHDLC SERPL-MCNC: 80 MG/DL
PLATELET # BLD AUTO: 282 K/UL (ref 150–450)
PMV BLD AUTO: 10.8 FL (ref 9.2–12.9)
POTASSIUM SERPL-SCNC: 4 MMOL/L (ref 3.5–5.1)
PROT SERPL-MCNC: 7.1 G/DL (ref 6–8.4)
RBC # BLD AUTO: 4.09 M/UL (ref 4.6–6.2)
RPR SER QL: NORMAL
SODIUM SERPL-SCNC: 136 MMOL/L (ref 136–145)
TRIGL SERPL-MCNC: 106 MG/DL (ref 30–150)
TSH SERPL DL<=0.005 MIU/L-ACNC: 1.27 UIU/ML (ref 0.4–4)
VARICELLA INTERPRETATION: POSITIVE
VARICELLA ZOSTER IGG: 3.39 ISR (ref 0–0.9)
WBC # BLD AUTO: 7.54 K/UL (ref 3.9–12.7)

## 2022-08-15 ENCOUNTER — TELEPHONE (OUTPATIENT)
Dept: PRIMARY CARE CLINIC | Facility: CLINIC | Age: 32
End: 2022-08-15
Payer: COMMERCIAL

## 2022-08-15 LAB — HIV 1+2 AB+HIV1 P24 AG SERPL QL IA: NEGATIVE

## 2022-08-15 NOTE — TELEPHONE ENCOUNTER
Spoke with Jane with [facility] lab. Sample for C. trachomatis/N. gonorrhoeae by AMP DNA was insufficient.

## 2022-08-15 NOTE — TELEPHONE ENCOUNTER
----- Message from Jane Brar sent at 8/15/2022  2:03 AM CDT -----  Regarding: Lab Client Services  Contact: [phone]  Good Morning,     My name is Jane Brar I work in the Lab Client Services. We had a problem with some lab work on this patient. If someone from your office could call us at [phone] or ext. [phone] that would be great. Anyone in my department can help.      Thank you

## 2022-08-16 DIAGNOSIS — Z11.3 SCREEN FOR STD (SEXUALLY TRANSMITTED DISEASE): Primary | ICD-10-CM

## 2022-08-16 LAB
HAV IGM SERPL QL IA: NEGATIVE
HBV CORE IGM SERPL QL IA: NEGATIVE
HBV SURFACE AB SER-ACNC: POSITIVE M[IU]/ML
HBV SURFACE AG SERPL QL IA: NEGATIVE
HCV AB SERPL QL IA: NEGATIVE

## 2022-08-19 DIAGNOSIS — D64.9 ANEMIA, UNSPECIFIED TYPE: Primary | ICD-10-CM

## 2022-08-19 DIAGNOSIS — Z11.3 SCREENING EXAMINATION FOR STD (SEXUALLY TRANSMITTED DISEASE): Primary | ICD-10-CM

## 2022-08-19 LAB — HSV AB, IGM BY EIA: 0.64 INDEX

## 2022-08-23 LAB — HEV IGM SER QL: NOT DETECTED

## 2022-08-26 ENCOUNTER — LAB VISIT (OUTPATIENT)
Dept: LAB | Facility: HOSPITAL | Age: 32
End: 2022-08-26
Attending: FAMILY MEDICINE
Payer: COMMERCIAL

## 2022-08-26 DIAGNOSIS — D64.9 ANEMIA, UNSPECIFIED TYPE: ICD-10-CM

## 2022-08-26 LAB
BASOPHILS # BLD AUTO: 0.06 K/UL (ref 0–0.2)
BASOPHILS NFR BLD: 1 % (ref 0–1.9)
DIFFERENTIAL METHOD: ABNORMAL
EOSINOPHIL # BLD AUTO: 0.3 K/UL (ref 0–0.5)
EOSINOPHIL NFR BLD: 4.1 % (ref 0–8)
ERYTHROCYTE [DISTWIDTH] IN BLOOD BY AUTOMATED COUNT: 12.6 % (ref 11.5–14.5)
FERRITIN SERPL-MCNC: 351 NG/ML (ref 20–300)
FOLATE SERPL-MCNC: 6.2 NG/ML (ref 4–24)
HCT VFR BLD AUTO: 43.3 % (ref 40–54)
HGB BLD-MCNC: 14.6 G/DL (ref 14–18)
IMM GRANULOCYTES # BLD AUTO: 0.02 K/UL (ref 0–0.04)
IMM GRANULOCYTES NFR BLD AUTO: 0.3 % (ref 0–0.5)
IRON SERPL-MCNC: 106 UG/DL (ref 45–160)
LYMPHOCYTES # BLD AUTO: 2.5 K/UL (ref 1–4.8)
LYMPHOCYTES NFR BLD: 39.5 % (ref 18–48)
MCH RBC QN AUTO: 31.7 PG (ref 27–31)
MCHC RBC AUTO-ENTMCNC: 33.7 G/DL (ref 32–36)
MCV RBC AUTO: 94 FL (ref 82–98)
MONOCYTES # BLD AUTO: 0.5 K/UL (ref 0.3–1)
MONOCYTES NFR BLD: 8.3 % (ref 4–15)
NEUTROPHILS # BLD AUTO: 3 K/UL (ref 1.8–7.7)
NEUTROPHILS NFR BLD: 46.8 % (ref 38–73)
NRBC BLD-RTO: 0 /100 WBC
PLATELET # BLD AUTO: 310 K/UL (ref 150–450)
PMV BLD AUTO: 10.5 FL (ref 9.2–12.9)
RBC # BLD AUTO: 4.6 M/UL (ref 4.6–6.2)
SATURATED IRON: 24 % (ref 20–50)
TOTAL IRON BINDING CAPACITY: 438 UG/DL (ref 250–450)
TRANSFERRIN SERPL-MCNC: 296 MG/DL (ref 200–375)
VIT B12 SERPL-MCNC: 402 PG/ML (ref 210–950)
WBC # BLD AUTO: 6.3 K/UL (ref 3.9–12.7)

## 2022-08-26 PROCEDURE — 82728 ASSAY OF FERRITIN: CPT | Performed by: FAMILY MEDICINE

## 2022-08-26 PROCEDURE — 82746 ASSAY OF FOLIC ACID SERUM: CPT | Performed by: FAMILY MEDICINE

## 2022-08-26 PROCEDURE — 82607 VITAMIN B-12: CPT | Performed by: FAMILY MEDICINE

## 2022-08-26 PROCEDURE — 85025 COMPLETE CBC W/AUTO DIFF WBC: CPT | Performed by: FAMILY MEDICINE

## 2022-08-26 PROCEDURE — 36415 COLL VENOUS BLD VENIPUNCTURE: CPT | Performed by: FAMILY MEDICINE

## 2022-08-26 PROCEDURE — 84466 ASSAY OF TRANSFERRIN: CPT | Performed by: FAMILY MEDICINE

## 2022-08-31 ENCOUNTER — PATIENT MESSAGE (OUTPATIENT)
Dept: PSYCHIATRY | Facility: CLINIC | Age: 32
End: 2022-08-31
Payer: COMMERCIAL

## 2022-09-02 ENCOUNTER — PATIENT MESSAGE (OUTPATIENT)
Dept: PSYCHIATRY | Facility: CLINIC | Age: 32
End: 2022-09-02
Payer: COMMERCIAL

## 2022-09-20 ENCOUNTER — OFFICE VISIT (OUTPATIENT)
Dept: PSYCHIATRY | Facility: CLINIC | Age: 32
End: 2022-09-20
Payer: COMMERCIAL

## 2022-09-20 DIAGNOSIS — F90.2 ADHD (ATTENTION DEFICIT HYPERACTIVITY DISORDER), COMBINED TYPE: ICD-10-CM

## 2022-09-20 DIAGNOSIS — F41.9 ANXIETY: ICD-10-CM

## 2022-09-20 DIAGNOSIS — F41.1 ANXIETY, GENERALIZED: ICD-10-CM

## 2022-09-20 PROCEDURE — 3044F HG A1C LEVEL LT 7.0%: CPT | Mod: CPTII,95,, | Performed by: NURSE PRACTITIONER

## 2022-09-20 PROCEDURE — 3044F PR MOST RECENT HEMOGLOBIN A1C LEVEL <7.0%: ICD-10-PCS | Mod: CPTII,95,, | Performed by: NURSE PRACTITIONER

## 2022-09-20 PROCEDURE — 99214 PR OFFICE/OUTPT VISIT, EST, LEVL IV, 30-39 MIN: ICD-10-PCS | Mod: 95,,, | Performed by: NURSE PRACTITIONER

## 2022-09-20 PROCEDURE — 99214 OFFICE O/P EST MOD 30 MIN: CPT | Mod: 95,,, | Performed by: NURSE PRACTITIONER

## 2022-09-20 RX ORDER — LISDEXAMFETAMINE DIMESYLATE 30 MG/1
30 CAPSULE ORAL EVERY MORNING
Qty: 30 CAPSULE | Refills: 0 | Status: SHIPPED | OUTPATIENT
Start: 2022-09-20 | End: 2022-12-13 | Stop reason: SDUPTHER

## 2022-09-20 RX ORDER — LISDEXAMFETAMINE DIMESYLATE 30 MG/1
30 CAPSULE ORAL EVERY MORNING
Qty: 30 CAPSULE | Refills: 0 | Status: SHIPPED | OUTPATIENT
Start: 2022-10-18 | End: 2022-11-14 | Stop reason: SDUPTHER

## 2022-09-20 RX ORDER — LISDEXAMFETAMINE DIMESYLATE 30 MG/1
30 CAPSULE ORAL EVERY MORNING
Qty: 30 CAPSULE | Refills: 0 | Status: SHIPPED | OUTPATIENT
Start: 2022-11-16 | End: 2023-08-01 | Stop reason: SDUPTHER

## 2022-09-20 NOTE — PROGRESS NOTES
9/20/2022 10:30 AM   Kei Sharma   1990   [id number]                                                                   OUTPATIENT PSYCHIATRY FOLLOW- UP VISIT     Reason for Encounter:  Kei Sharma, a 32 y.o. male,who presents today for follow up of initial visit. Met with patient.     Interval History and Content of Current Session:     Today,  Pt reports today as a follow up from previous visit about 4 months prior. Reports has been depleted of Vyvanse for the last few weeks. Reports some issues with tiredness that had been occurring but resolved with Vyvanse 15 mg by mouth once daily and has been doing 30 mg some days. This continues to be effective for focus, attention, task management lately.     Being off of medication has produced anxiety, some short-term memory loss occurring as with being off of it.      Denies SI/HI, AVH, racing thoughts, mood lability     Psychosocial stressors: occupational, family, social pressures        Review Of Systems:      Medical Review Of Systems:  Pertinent items are noted in HPI.     Psychiatric Review Of Systems:  sleep: yes, but intermittent, 6 to 7 hours of sleep nightly  appetite changes: no  weight changes: no  energy/anergy: no  interest/pleasure/anhedonia: no  somatic symptoms: no  libido: no  anxiety/panic: no  guilty/hopeless: no  S.I.B.s/risky behavior: no  any drugs: no  alcohol: no       Sleep: 6 to 7 hours of sleep per night, see HPI        Risk Parameters:  Patient reports no suicidal ideation  Patient reports no homicidal ideation  Patient reports no self-injurious behavior  Patient reports no violent behavior        Current meds- Vyvanse     Compliance: NA     Side effects: mild tiredness/fatigue in the afternoons, but resolved with 15 mg daily (halving dosage)        Psychiatric, social, and family history reviewed this visit        Objective      ALL MEDICATIONS:  No current outpatient medications on file.     Current  "Facility-Administered Medications:        ALLERGIES:  Review of patient's allergies indicates:  No Known Allergies     RELEVANT LABS/STUDIES:              Lab Results   Component Value Date     WBC 7.76 02/25/2022     HGB 14.9 02/25/2022     HCT 44.1 02/25/2022     MCV 95 02/25/2022      02/25/2022      BMP            Lab Results   Component Value Date      02/25/2022     K 4.2 02/25/2022     CL 96 02/25/2022     CO2 33 (H) 02/25/2022     BUN 14 02/25/2022     CREATININE 1.3 02/25/2022     CALCIUM 10.1 02/25/2022     ANIONGAP 10 02/25/2022     ESTGFRAFRICA >60.0 02/25/2022     EGFRNONAA >60.0 02/25/2022                Lab Results   Component Value Date     ALT 50 (H) 02/25/2022     AST 65 (H) 02/25/2022     ALKPHOS 41 (L) 02/25/2022     BILITOT 1.5 (H) 02/25/2022      No results found for: TSH  No results found for: LABA1C, HGBA1C     Constitutional  Vitals:  Most recent vital signs, dated less than 90 days prior to this appointment, were reviewed.    There were no vitals filed for this visit.            PHYSICAL EXAM  General: well developed, well nourished  Neurologic:   Gait: Normal   Psychomotor signs:  No involuntary movements or tremor  AIMS: none     PSYCHIATRIC EXAM:     Mental Status Exam:  Appearance: unremarkable, age appropriate  Behavior/Cooperation: normal, cooperative  Speech: normal tone, normal rate, normal pitch, normal volume  Language: uses words appropriately; NO aphasia or dysarthria  Mood: "I feel fine"  Affect: full, congruent with mood and appropriate to content/situation  Thought Process: normal and logical  Thought Content: normal, no suicidality, no homicidality, delusions, or paranoia  Level of Consciousness: Alert and Oriented x3  Memory:  Intact  Attention/concentration: appropriate for age/education.   Fund of Knowledge: appears adequate  Insight:  Intact  Judgment: Intact      Assessment and Diagnosis   Status/Progress: Based on the examination completed last visit and " today patient presents with symptoms consistent with ADHD. He is academically gifted and appears intelligent so these symptoms are likely to have been unrecognized. He has some difficulty with anxiety and obsessive thinking and some perfectionistic tendencies.      General Impression:   ADHD, combined type  Anxiety, NOS        Intervention/Counseling/Treatment Plan   Medication Management: -        Continue Vyvanse 30 mg by mouth once daily  Labs: reviewed most recent  The treatment plan and follow up plan were reviewed with the patient.  Discussed with patient informed consent, risks vs. benefits, alternative treatments, side effect profile and the inherent unpredictability of individual responses to these treatments. The patient expresses understanding of the above and displays the capacity to agree with this current plan and had no other questions.  Encouraged Patient to keep future appointments.   Take medications as prescribed and abstain from substance abuse.   In the event of an emergency patient was advised to go to the emergency room.  Recommend psychotherapy this visit     Return to Clinic: 1 to 3 months     > than 50% of total time spend on coordination of care and counseling   (which included pts differential diagnosis and prognosis for psychiatric conditions, risks, benefits of treatments, instructions and adherence to treatment plan, risk reduction, reviewing current psychiatric medication regimen, medical problems and social stressors. In addtion to possible discussion with other healthcare provider/s)     Add on Psychotherapy time:0  Total Face time: 20 min     The patient location is: [place], at home  The chief complaint leading to consultation is: f/u of initial visit     Visit type: audiovisual     Face to Face time with patient: 20 min  25 minutes of total time spent on the encounter, which includes face to face time and non-face to face time preparing to see the patient (eg, review of tests),  Obtaining and/or reviewing separately obtained history, Documenting clinical information in the electronic or other health record, Independently interpreting results (not separately reported) and communicating results to the patient/family/caregiver, or Care coordination (not separately reported).            Each patient to whom he or she provides medical services by telemedicine is:  (1) informed of the relationship between the physician and patient and the respective role of any other health care provider with respect to management of the patient; and (2) notified that he or she may decline to receive medical services by telemedicine and may withdraw from such care at any time.     Notes:      Kentrell Galvez, MSN, APRN, PMHNP-BC  [facility]   9/20/2022 10:30 AM

## 2022-09-22 ENCOUNTER — TELEPHONE (OUTPATIENT)
Dept: RESEARCH | Facility: HOSPITAL | Age: 32
End: 2022-09-22
Payer: COMMERCIAL

## 2022-09-22 NOTE — TELEPHONE ENCOUNTER
Study title: A Phase 1/2/3. Placebo Controlled, Randomized, Observer-Blind, Dose-Finding Study to Describe the Safety, Tolerability, Immunogenicity and Potential Efficacy of SARS-COV-2 RNA Vaccine Candidates Against COVID-19 in Health Adults   IRB #: 2020.198  Sponsor: [employer]   Sponsor's Protocol: [id number]  Site Number: [id number]  Subject ID: [id number]    The participant has not completed a weekly illness diary entry since 7 SEP 2022.     CRC contacted the participant via telephone and reviewed the information below:    Has the participant experienced any potential COVID-19 symptoms since the illness diary was last completed? no    The participant was re-educated on completing the eDiary as required per protocol and expressed understanding.

## 2022-09-29 ENCOUNTER — TELEPHONE (OUTPATIENT)
Dept: RESEARCH | Facility: HOSPITAL | Age: 32
End: 2022-09-29
Payer: COMMERCIAL

## 2022-09-29 ENCOUNTER — RESEARCH ENCOUNTER (OUTPATIENT)
Dept: RESEARCH | Facility: HOSPITAL | Age: 32
End: 2022-09-29
Payer: COMMERCIAL

## 2022-09-29 NOTE — PROGRESS NOTES
"Study title: A Phase 1/2/3. Placebo Controlled, Randomized, Observer-Blind, Dose-Finding Study to Describe the Safety, Tolerability, Immunogenicity and Potential Efficacy of SARS-COV-2 RNA Vaccine Candidates Against COVID-19 in Healthy Adults   IRB #: 2020.198  Sponsor: [employer]   Sponsor's Protocol: [id number]  Site Number: [id number]  Subject ID: [id number]    Potential COVID-19 Illness Visit A    Kei Sharma  reported "YES" in the COVID Illness Diary and based on PI decision a POTENTIAL COVID-19 VISIT is deemed necessary.     The CRC contacted the participant via telephone and collected the following information:    The subject has the following symptoms:    Fever: yes  New or increased cough:  yes  New or increased shortness of breath: no  Chills: no  New or increased muscle pain: no  New loss of taste/smell: no  New or increased sore throat: no  diarrhea: no  vomiting: no  Hospitaliztion for a severe illness with no other altenative etiology: no  Hospitilization due to confirmed COVID-19 infection: no    Symptom start date: 27 SEP 2022    Symptoms resolved? yes  IF YES, date resolved: 29 SEP 2022    Was subject instructed to collect self swab? yes  Anticipated date of collection: 30 SEP 2022  Remind subject to write subject ID on the self-swab card contained in kit.     Did subject receive an outside COVID-19 test?  yes  If yes, Positive  If yes, date: 29 SEP 2022    Did the subject seek outside medical care?  no    Toxicity Grade:  1 - MILD: Does not interfere with participant's usual function.          "

## 2022-09-29 NOTE — TELEPHONE ENCOUNTER
Study title: A Phase 1/2/3. Placebo Controlled, Randomized, Observer-Blind, Dose-Finding Study to Describe the Safety, Tolerability, Immunogenicity and Potential Efficacy of SARS-COV-2 RNA Vaccine Candidates Against COVID-19 in Health Adults   IRB #: 2020.198  Sponsor: [employer]   Sponsor's Protocol: [id number]  Site Number: [id number]  Subject ID: [id number]    Subject mailed 2 Replacement Self-Swab kits. (Overnight)    Swab exp: 31 MAR 2024    Vial exp: 01 MAR 2023

## 2022-10-06 ENCOUNTER — RESEARCH ENCOUNTER (OUTPATIENT)
Dept: RESEARCH | Facility: CLINIC | Age: 32
End: 2022-10-06
Payer: COMMERCIAL

## 2022-10-06 NOTE — PROGRESS NOTES
Study title: A Phase 1/2/3. Placebo Controlled, Randomized, Observer-Blind, Dose-Finding Study to Describe the Safety, Tolerability, Immunogenicity and Potential Efficacy of SARS-COV-2 RNA Vaccine Candidates Against COVID-19 in Healthy Adults   IRB #: [id number]  Sponsor: [employer]   Sponsor's Protocol: [id number]  Site Number: [id number]  Subject ID: [id number]    Site contacted subject to inform subject of study ending per PA20. Subject was informed that the study was ending earlier than planned as the study is no longer testing additional vaccine doses or new variant vaccines on study, and the required safety follow up period has been completed. Subject was informed that from today,06OCT2022, forward, study participation has concluded. Subject expressed understanding and was thanked for their participation in this study.    Subject was informed that Dr. Prabhakar Milton replaced Dr. Magui Peterson as PI of the study. yes      Did the subject have any ongoing AE's no  If yes, AE end date? N/A    Did the subject have any ongoing illness symptoms? no  If yes, symptom resolution date? N/A    Did the subject have a provisional device? no  Subject was instructed to return provisional device? N/A    Did the subject use a personal device? yes  Subject was instructed to delete Trial Max mariama from device? yes      Off Study Date: 06OCT2022  Off Study Reason: Follow up Complete per PA20.

## 2022-12-13 ENCOUNTER — OFFICE VISIT (OUTPATIENT)
Dept: PSYCHIATRY | Facility: CLINIC | Age: 32
End: 2022-12-13
Payer: COMMERCIAL

## 2022-12-13 DIAGNOSIS — F90.2 ADHD (ATTENTION DEFICIT HYPERACTIVITY DISORDER), COMBINED TYPE: ICD-10-CM

## 2022-12-13 DIAGNOSIS — F41.9 ANXIETY: ICD-10-CM

## 2022-12-13 PROCEDURE — 99214 PR OFFICE/OUTPT VISIT, EST, LEVL IV, 30-39 MIN: ICD-10-PCS | Mod: 95,,, | Performed by: NURSE PRACTITIONER

## 2022-12-13 PROCEDURE — 3044F PR MOST RECENT HEMOGLOBIN A1C LEVEL <7.0%: ICD-10-PCS | Mod: CPTII,95,, | Performed by: NURSE PRACTITIONER

## 2022-12-13 PROCEDURE — 3044F HG A1C LEVEL LT 7.0%: CPT | Mod: CPTII,95,, | Performed by: NURSE PRACTITIONER

## 2022-12-13 PROCEDURE — 99214 OFFICE O/P EST MOD 30 MIN: CPT | Mod: 95,,, | Performed by: NURSE PRACTITIONER

## 2022-12-13 RX ORDER — LISDEXAMFETAMINE DIMESYLATE 30 MG/1
30 CAPSULE ORAL EVERY MORNING
Qty: 30 CAPSULE | Refills: 0 | Status: SHIPPED | OUTPATIENT
Start: 2023-01-11 | End: 2023-08-01 | Stop reason: SDUPTHER

## 2022-12-13 RX ORDER — LISDEXAMFETAMINE DIMESYLATE 30 MG/1
30 CAPSULE ORAL EVERY MORNING
Qty: 30 CAPSULE | Refills: 0 | Status: SHIPPED | OUTPATIENT
Start: 2022-12-13 | End: 2023-08-01 | Stop reason: SDUPTHER

## 2022-12-13 RX ORDER — LISDEXAMFETAMINE DIMESYLATE 30 MG/1
30 CAPSULE ORAL EVERY MORNING
Qty: 30 CAPSULE | Refills: 0 | Status: SHIPPED | OUTPATIENT
Start: 2023-02-09 | End: 2023-03-13 | Stop reason: SDUPTHER

## 2022-12-13 NOTE — PROGRESS NOTES
"12/13/2022 10:30 AM   Kie Sharma   1990   [id number]                                                                   OUTPATIENT PSYCHIATRY FOLLOW- UP VISIT     Reason for Encounter:  Kei Sharma, a 32 y.o. male,who presents today for follow up of initial visit. Met with patient.     Interval History and Content of Current Session:     Today,  Pt reports today as a follow up from previous visit about 4 months prior. Reports has been depleted of Vyvanse for the last few weeks.     Reports some issues with tiredness that had been occurring but resolved with Vyvanse 15 mg by mouth once daily and has been doing 30 mg some days. This continues to be effective for focus, attention, task management lately.     Denies new anxieties, work going well, some manageable work stresses. Discussed getting fills every 3 months consistently to help    Setting boundaries with family lately and this has been helpful.     Denies headache, chest pain, SOB, dizziness     Denies SI/HI, AVH, racing thoughts, mood lability     Psychosocial stressors: occupational, family, social pressures        Review Of Systems:      Medical Review Of Systems:  Pertinent items are noted in HPI.     Psychiatric Review Of Systems:  sleep: 6 to 7 hours of sleep, "feeling more rested."   appetite changes: no  weight changes: no  energy/anergy: no  interest/pleasure/anhedonia: no  somatic symptoms: no  libido: no  anxiety/panic: no  guilty/hopeless: no  S.I.B.s/risky behavior: no  any drugs: no  alcohol: no       Sleep: 6 to 7 hours of sleep per night, see HPI        Risk Parameters:  Patient reports no suicidal ideation  Patient reports no homicidal ideation  Patient reports no self-injurious behavior  Patient reports no violent behavior        Current meds- Vyvanse     Compliance: NA     Side effects: mild tiredness/fatigue in the afternoons, but resolved with 15 mg daily (halving dosage)        Psychiatric, social, and family " "history reviewed this visit        Objective      ALL MEDICATIONS:  No current outpatient medications on file.     Current Facility-Administered Medications:         ALLERGIES:  Review of patient's allergies indicates:  No Known Allergies     RELEVANT LABS/STUDIES:              Lab Results   Component Value Date     WBC 7.76 02/25/2022     HGB 14.9 02/25/2022     HCT 44.1 02/25/2022     MCV 95 02/25/2022      02/25/2022      BMP            Lab Results   Component Value Date      02/25/2022     K 4.2 02/25/2022     CL 96 02/25/2022     CO2 33 (H) 02/25/2022     BUN 14 02/25/2022     CREATININE 1.3 02/25/2022     CALCIUM 10.1 02/25/2022     ANIONGAP 10 02/25/2022     ESTGFRAFRICA >60.0 02/25/2022     EGFRNONAA >60.0 02/25/2022                Lab Results   Component Value Date     ALT 50 (H) 02/25/2022     AST 65 (H) 02/25/2022     ALKPHOS 41 (L) 02/25/2022     BILITOT 1.5 (H) 02/25/2022      No results found for: TSH  No results found for: LABA1C, HGBA1C     Constitutional  Vitals:  Most recent vital signs, dated less than 90 days prior to this appointment, were reviewed.    There were no vitals filed for this visit.            PHYSICAL EXAM  General: well developed, well nourished  Neurologic:   Gait: Normal   Psychomotor signs:  No involuntary movements or tremor  AIMS: none     PSYCHIATRIC EXAM:     Mental Status Exam:  Appearance: unremarkable, age appropriate  Behavior/Cooperation: normal, cooperative  Speech: normal tone, normal rate, normal pitch, normal volume  Language: uses words appropriately; NO aphasia or dysarthria  Mood: "I feel fine"  Affect: full, congruent with mood and appropriate to content/situation  Thought Process: normal and logical  Thought Content: normal, no suicidality, no homicidality, delusions, or paranoia  Level of Consciousness: Alert and Oriented x3  Memory:  Intact  Attention/concentration: appropriate for age/education.   Fund of Knowledge: appears adequate  Insight:  " Intact  Judgment: Intact      Assessment and Diagnosis   Status/Progress: Based on the examination completed last visit and today patient presents with symptoms consistent with ADHD. He is academically gifted and appears intelligent so these symptoms are likely to have been unrecognized. He has some difficulty with anxiety and obsessive thinking and some perfectionistic tendencies.      General Impression:   ADHD, combined type  Anxiety, NOS        Intervention/Counseling/Treatment Plan   Medication Management: -        Continue Vyvanse 30 mg by mouth once daily (Vyvanse 15 mg by mouth once daily, see HPI)  Labs: reviewed most recent  The treatment plan and follow up plan were reviewed with the patient.  Discussed with patient informed consent, risks vs. benefits, alternative treatments, side effect profile and the inherent unpredictability of individual responses to these treatments. The patient expresses understanding of the above and displays the capacity to agree with this current plan and had no other questions.  Encouraged Patient to keep future appointments.   Take medications as prescribed and abstain from substance abuse.   In the event of an emergency patient was advised to go to the emergency room.  Recommend psychotherapy this visit     Return to Clinic: 3 to 6 months     > than 50% of total time spend on coordination of care and counseling   (which included pts differential diagnosis and prognosis for psychiatric conditions, risks, benefits of treatments, instructions and adherence to treatment plan, risk reduction, reviewing current psychiatric medication regimen, medical problems and social stressors. In addtion to possible discussion with other healthcare provider/s)     Add on Psychotherapy time:0  Total Face time: 20 min     The patient location is: [place], at home  The chief complaint leading to consultation is: f/u of initial visit     Visit type: audiovisual     Face to Face time with  patient: 20 min  25 minutes of total time spent on the encounter, which includes face to face time and non-face to face time preparing to see the patient (eg, review of tests), Obtaining and/or reviewing separately obtained history, Documenting clinical information in the electronic or other health record, Independently interpreting results (not separately reported) and communicating results to the patient/family/caregiver, or Care coordination (not separately reported).            Each patient to whom he or she provides medical services by telemedicine is:  (1) informed of the relationship between the physician and patient and the respective role of any other health care provider with respect to management of the patient; and (2) notified that he or she may decline to receive medical services by telemedicine and may withdraw from such care at any time.     Notes:      Kentrell Galvez, MSN, APRN, PMHNP-BC [facility]   12/13/2022 10:30 AM

## 2023-02-06 ENCOUNTER — OFFICE VISIT (OUTPATIENT)
Dept: DERMATOLOGY | Facility: CLINIC | Age: 33
End: 2023-02-06
Payer: COMMERCIAL

## 2023-02-06 DIAGNOSIS — L21.9 SEBORRHEIC DERMATITIS: ICD-10-CM

## 2023-02-06 DIAGNOSIS — L57.0 ACTINIC KERATOSIS: Primary | ICD-10-CM

## 2023-02-06 PROCEDURE — 99203 PR OFFICE/OUTPT VISIT, NEW, LEVL III, 30-44 MIN: ICD-10-PCS | Mod: 25,S$GLB,, | Performed by: STUDENT IN AN ORGANIZED HEALTH CARE EDUCATION/TRAINING PROGRAM

## 2023-02-06 PROCEDURE — 99999 PR PBB SHADOW E&M-EST. PATIENT-LVL III: ICD-10-PCS | Mod: PBBFAC,,, | Performed by: STUDENT IN AN ORGANIZED HEALTH CARE EDUCATION/TRAINING PROGRAM

## 2023-02-06 PROCEDURE — 99999 PR PBB SHADOW E&M-EST. PATIENT-LVL III: CPT | Mod: PBBFAC,,, | Performed by: STUDENT IN AN ORGANIZED HEALTH CARE EDUCATION/TRAINING PROGRAM

## 2023-02-06 PROCEDURE — 99203 OFFICE O/P NEW LOW 30 MIN: CPT | Mod: 25,S$GLB,, | Performed by: STUDENT IN AN ORGANIZED HEALTH CARE EDUCATION/TRAINING PROGRAM

## 2023-02-06 PROCEDURE — 1159F PR MEDICATION LIST DOCUMENTED IN MEDICAL RECORD: ICD-10-PCS | Mod: CPTII,S$GLB,, | Performed by: STUDENT IN AN ORGANIZED HEALTH CARE EDUCATION/TRAINING PROGRAM

## 2023-02-06 PROCEDURE — 17000 PR DESTRUCTION(LASER SURGERY,CRYOSURGERY,CHEMOSURGERY),PREMALIGNANT LESIONS,FIRST LESION: ICD-10-PCS | Mod: S$GLB,,, | Performed by: STUDENT IN AN ORGANIZED HEALTH CARE EDUCATION/TRAINING PROGRAM

## 2023-02-06 PROCEDURE — 1160F PR REVIEW ALL MEDS BY PRESCRIBER/CLIN PHARMACIST DOCUMENTED: ICD-10-PCS | Mod: CPTII,S$GLB,, | Performed by: STUDENT IN AN ORGANIZED HEALTH CARE EDUCATION/TRAINING PROGRAM

## 2023-02-06 PROCEDURE — 1159F MED LIST DOCD IN RCRD: CPT | Mod: CPTII,S$GLB,, | Performed by: STUDENT IN AN ORGANIZED HEALTH CARE EDUCATION/TRAINING PROGRAM

## 2023-02-06 PROCEDURE — 17000 DESTRUCT PREMALG LESION: CPT | Mod: S$GLB,,, | Performed by: STUDENT IN AN ORGANIZED HEALTH CARE EDUCATION/TRAINING PROGRAM

## 2023-02-06 PROCEDURE — 1160F RVW MEDS BY RX/DR IN RCRD: CPT | Mod: CPTII,S$GLB,, | Performed by: STUDENT IN AN ORGANIZED HEALTH CARE EDUCATION/TRAINING PROGRAM

## 2023-02-06 NOTE — PROGRESS NOTES
Subjective:       Patient ID:  Kei Sharma is a 33 y.o. male who presents for   Chief Complaint   Patient presents with    Lesion     Nose, scalp     Patient with new complaint of lesion(s)  Location: scalp  Duration: 3 weeks  Symptoms: sore, tender  Relieving factors/Previous treatments: none    Patient with new complaint of lesion(s)  Location: nose  Duration: 1 year  Symptoms: scabs  Relieving factors/Previous treatments: none    No hx skin cancer    Review of Systems   Hematologic/Lymphatic: Does not bruise/bleed easily.      Objective:    Physical Exam   Constitutional: He appears well-developed and well-nourished. No distress.   Neurological: He is alert and oriented to person, place, and time. He is not disoriented.   Psychiatric: He has a normal mood and affect.   Skin:   Areas Examined (abnormalities noted in diagram):   Scalp / Hair Palpated and Inspected  Head / Face Inspection Performed            Diagram Legend     Erythematous scaling macule/papule c/w actinic keratosis       Vascular papule c/w angioma      Pigmented verrucoid papule/plaque c/w seborrheic keratosis      Yellow umbilicated papule c/w sebaceous hyperplasia      Irregularly shaped tan macule c/w lentigo     1-2 mm smooth white papules consistent with Milia      Movable subcutaneous cyst with punctum c/w epidermal inclusion cyst      Subcutaneous movable cyst c/w pilar cyst      Firm pink to brown papule c/w dermatofibroma      Pedunculated fleshy papule(s) c/w skin tag(s)      Evenly pigmented macule c/w junctional nevus     Mildly variegated pigmented, slightly irregular-bordered macule c/w mildly atypical nevus      Flesh colored to evenly pigmented papule c/w intradermal nevus       Pink pearly papule/plaque c/w basal cell carcinoma      Erythematous hyperkeratotic cursted plaque c/w SCC      Surgical scar with no sign of skin cancer recurrence      Open and closed comedones      Inflammatory papules and pustules       Verrucoid papule consistent consistent with wart     Erythematous eczematous patches and plaques     Dystrophic onycholytic nail with subungual debris c/w onychomycosis     Umbilicated papule    Erythematous-base heme-crusted tan verrucoid plaque consistent with inflamed seborrheic keratosis     Erythematous Silvery Scaling Plaque c/w Psoriasis     See annotation      Assessment / Plan:        Actinic keratosis--nasal bridge  -Patient is younger than is typical to see an AK but morphology is consistent  - RTC if lesion does not resolve with LN2  - counseled on photoprotection with daily sunscreen  - start annual UBSE    Cryosurgery Procedure Note  Verbal consent from the patient is obtained including, but not limited to, risk of hypopigmentation/hyperpigmentation, scar, recurrence of lesion. The patient is aware of the precancerous quality and need for treatment of these lesions. Liquid nitrogen cryosurgery is applied to the 1 actinic keratoses, as detailed in the physical exam, to produce a freeze injury. The patient is aware that blisters may form and is instructed on wound care with gentle cleansing and use of vaseline ointment to keep moist until healed. The patient is supplied a handout on cryosurgery and is instructed to call if lesions do not completely resolve.    Seborrheic dermatitis  - can use OTC selsun blue TIW         Follow up in about 1 year (around 2/6/2024).

## 2023-02-06 NOTE — PATIENT INSTRUCTIONS
Selsun blue--use 3 times weekly. Leave on for 5 minutes then wash off    ____________  CRYOSURGERY      Your doctor has used a method called cryosurgery to treat your skin condition. Cryosurgery refers to the use of very cold substances to treat a variety of skin conditions such as warts, pre-skin cancers, molluscum contagiosum, sun spots, and several benign growths. The substance we use in cryosurgery is liquid nitrogen and is so cold (-195 degrees Celsius) that is burns when administered.     Following treatment in the office, the skin may immediately burn and become red. You may find the area around the lesion is affected as well. It is sometimes necessary to treat not only the lesion, but a small area of the surrounding normal skin to achieve a good response.     A blister, and even a blood filled blister, may form after treatment.   This is a normal response. If the blister is painful, it is acceptable to sterilize a needle and with rubbing alcohol and gently pop the blister. It is important that you gently wash the area with soap and warm water as the blister fluid may contain wart virus if a wart was treated. Do no remove the roof of the blister.     The area treated can take anywhere from 1-3 weeks to heal. Healing time depends on the kind skin lesion treated, the location, and how aggressively the lesion was treated. It is recommended that the areas treated are covered with Vaseline or bacitracin ointment and a band-aid. If a band-aid is not practical, just ointment applied several times per day will do. Keeping these areas moist will speed the healing time.    Treatment with liquid nitrogen can leave a scar. In dark skin, it may be a light or dark scar, in light skin it may be a white or pink scar. These will generally fade with time, but may never go away completely.     If you have any concerns after your treatment, please feel free to call the office.       [address] [facility] [address]/  [phone] FAX/ [email or web address]

## 2023-03-13 ENCOUNTER — PATIENT MESSAGE (OUTPATIENT)
Dept: PSYCHIATRY | Facility: CLINIC | Age: 33
End: 2023-03-13

## 2023-03-13 ENCOUNTER — OFFICE VISIT (OUTPATIENT)
Dept: PSYCHIATRY | Facility: CLINIC | Age: 33
End: 2023-03-13
Payer: COMMERCIAL

## 2023-03-13 DIAGNOSIS — F90.2 ADHD (ATTENTION DEFICIT HYPERACTIVITY DISORDER), COMBINED TYPE: Primary | ICD-10-CM

## 2023-03-13 DIAGNOSIS — F41.9 ANXIETY: ICD-10-CM

## 2023-03-13 PROCEDURE — 99213 OFFICE O/P EST LOW 20 MIN: CPT | Mod: 95,,, | Performed by: NURSE PRACTITIONER

## 2023-03-13 PROCEDURE — 99213 PR OFFICE/OUTPT VISIT, EST, LEVL III, 20-29 MIN: ICD-10-PCS | Mod: 95,,, | Performed by: NURSE PRACTITIONER

## 2023-03-13 RX ORDER — LISDEXAMFETAMINE DIMESYLATE 30 MG/1
30 CAPSULE ORAL EVERY MORNING
Qty: 30 CAPSULE | Refills: 0 | Status: SHIPPED | OUTPATIENT
Start: 2023-05-08 | End: 2023-08-01 | Stop reason: SDUPTHER

## 2023-03-13 RX ORDER — LISDEXAMFETAMINE DIMESYLATE 30 MG/1
30 CAPSULE ORAL EVERY MORNING
Qty: 30 CAPSULE | Refills: 0 | Status: SHIPPED | OUTPATIENT
Start: 2023-03-13 | End: 2023-05-15 | Stop reason: SDUPTHER

## 2023-03-13 RX ORDER — LISDEXAMFETAMINE DIMESYLATE 30 MG/1
30 CAPSULE ORAL EVERY MORNING
Qty: 30 CAPSULE | Refills: 0 | Status: SHIPPED | OUTPATIENT
Start: 2023-04-11 | End: 2023-04-11 | Stop reason: SDUPTHER

## 2023-03-13 NOTE — PROGRESS NOTES
"3/13/2023 6:30 PM  Kei Sharma   1990   [id number]                                                                   OUTPATIENT PSYCHIATRY FOLLOW- UP VISIT     Reason for Encounter:  Kei Sharma, a 33 y.o. male,who presents today for follow up of initial visit. Met with patient.     Interval History and Content of Current Session:     Today,  Pt reports today as a follow up from previous visit about 3 months prior. Reports has been depleted of Vyvanse for the last few weeks.      Reports some issues with tiredness that had been occurring but resolved with Vyvanse 15 mg by mouth once daily and has been doing 30 mg some days. This continues to be effective for focus, attention, task management lately.      Denies new anxieties, work going well, some manageable work stresses. Discussed getting fills every 3 months consistently to help     Setting boundaries with family lately and this has been helpful.      Denies headache, chest pain, SOB, dizziness     Denies SI/HI, AVH, racing thoughts, mood lability     Psychosocial stressors: occupational, family, social pressures        Review Of Systems:      Medical Review Of Systems:  Pertinent items are noted in HPI.     Psychiatric Review Of Systems:  sleep: 6 to 7 hours of sleep, "feeling more rested."   appetite changes: no  weight changes: no  energy/anergy: no  interest/pleasure/anhedonia: no  somatic symptoms: no  libido: no  anxiety/panic: no  guilty/hopeless: no  S.I.B.s/risky behavior: no  any drugs: no  alcohol: no       Sleep: 6 to 7 hours of sleep per night, see HPI        Risk Parameters:  Patient reports no suicidal ideation  Patient reports no homicidal ideation  Patient reports no self-injurious behavior  Patient reports no violent behavior        Current meds- Vyvanse     Compliance: NA     Side effects: mild tiredness/fatigue in the afternoons, but resolved with 15 mg daily (halving dosage)        Psychiatric, social, and family " "history reviewed this visit        Objective      ALL MEDICATIONS:  No current outpatient medications on file.     Current Facility-Administered Medications:         ALLERGIES:  Review of patient's allergies indicates:  No Known Allergies     RELEVANT LABS/STUDIES:              Lab Results   Component Value Date     WBC 7.76 02/25/2022     HGB 14.9 02/25/2022     HCT 44.1 02/25/2022     MCV 95 02/25/2022      02/25/2022      BMP            Lab Results   Component Value Date      02/25/2022     K 4.2 02/25/2022     CL 96 02/25/2022     CO2 33 (H) 02/25/2022     BUN 14 02/25/2022     CREATININE 1.3 02/25/2022     CALCIUM 10.1 02/25/2022     ANIONGAP 10 02/25/2022     ESTGFRAFRICA >60.0 02/25/2022     EGFRNONAA >60.0 02/25/2022                Lab Results   Component Value Date     ALT 50 (H) 02/25/2022     AST 65 (H) 02/25/2022     ALKPHOS 41 (L) 02/25/2022     BILITOT 1.5 (H) 02/25/2022      No results found for: TSH  No results found for: LABA1C, HGBA1C     Constitutional  Vitals:  Most recent vital signs, dated less than 90 days prior to this appointment, were reviewed.    There were no vitals filed for this visit.            PHYSICAL EXAM  General: well developed, well nourished  Neurologic:   Gait: Normal   Psychomotor signs:  No involuntary movements or tremor  AIMS: none     PSYCHIATRIC EXAM:     Mental Status Exam:  Appearance: unremarkable, age appropriate  Behavior/Cooperation: normal, cooperative  Speech: normal tone, normal rate, normal pitch, normal volume  Language: uses words appropriately; NO aphasia or dysarthria  Mood: "I feel fine"  Affect: full, congruent with mood and appropriate to content/situation  Thought Process: normal and logical  Thought Content: normal, no suicidality, no homicidality, delusions, or paranoia  Level of Consciousness: Alert and Oriented x3  Memory:  Intact  Attention/concentration: appropriate for age/education.   Fund of Knowledge: appears adequate  Insight:  " Intact  Judgment: Intact      Assessment and Diagnosis   Status/Progress: Based on the examination completed last visit and today patient presents with symptoms consistent with ADHD. He is academically gifted and appears intelligent so these symptoms are likely to have been unrecognized. He has some difficulty with anxiety and obsessive thinking and some perfectionistic tendencies.      General Impression:   ADHD, combined type  Anxiety, NOS        Intervention/Counseling/Treatment Plan   Medication Management: -        Continue Vyvanse 30 mg by mouth once daily (Vyvanse 15 mg by mouth once daily, see HPI)  Labs: reviewed most recent  The treatment plan and follow up plan were reviewed with the patient.  Discussed with patient informed consent, risks vs. benefits, alternative treatments, side effect profile and the inherent unpredictability of individual responses to these treatments. The patient expresses understanding of the above and displays the capacity to agree with this current plan and had no other questions.  Encouraged Patient to keep future appointments.   Take medications as prescribed and abstain from substance abuse.   In the event of an emergency patient was advised to go to the emergency room.  Recommend psychotherapy this visit     Return to Clinic: 3 to 6 months     > than 50% of total time spend on coordination of care and counseling   (which included pts differential diagnosis and prognosis for psychiatric conditions, risks, benefits of treatments, instructions and adherence to treatment plan, risk reduction, reviewing current psychiatric medication regimen, medical problems and social stressors. In addtion to possible discussion with other healthcare provider/s)     Add on Psychotherapy time:0  Total Face time: 20 min     The patient location is: [place], at home  The chief complaint leading to consultation is: f/u of initial visit     Visit type: audiovisual     Face to Face time with  patient: 20 min  25 minutes of total time spent on the encounter, which includes face to face time and non-face to face time preparing to see the patient (eg, review of tests), Obtaining and/or reviewing separately obtained history, Documenting clinical information in the electronic or other health record, Independently interpreting results (not separately reported) and communicating results to the patient/family/caregiver, or Care coordination (not separately reported).            Each patient to whom he or she provides medical services by telemedicine is:  (1) informed of the relationship between the physician and patient and the respective role of any other health care provider with respect to management of the patient; and (2) notified that he or she may decline to receive medical services by telemedicine and may withdraw from such care at any time.     Notes:      Kentrell Galvez, MSN, APRN, PMHNP-BC  [facility]   3/13/2023 6:30 PM

## 2023-05-15 DIAGNOSIS — F90.2 ADHD (ATTENTION DEFICIT HYPERACTIVITY DISORDER), COMBINED TYPE: ICD-10-CM

## 2023-05-15 DIAGNOSIS — F41.9 ANXIETY: ICD-10-CM

## 2023-05-17 RX ORDER — LISDEXAMFETAMINE DIMESYLATE 30 MG/1
30 CAPSULE ORAL EVERY MORNING
Qty: 30 CAPSULE | Refills: 0 | Status: SHIPPED | OUTPATIENT
Start: 2023-05-17 | End: 2023-08-01 | Stop reason: SDUPTHER

## 2023-07-19 ENCOUNTER — PATIENT MESSAGE (OUTPATIENT)
Dept: PSYCHIATRY | Facility: CLINIC | Age: 33
End: 2023-07-19
Payer: COMMERCIAL

## 2023-07-19 DIAGNOSIS — F41.9 ANXIETY: ICD-10-CM

## 2023-07-19 DIAGNOSIS — F90.2 ADHD (ATTENTION DEFICIT HYPERACTIVITY DISORDER), COMBINED TYPE: ICD-10-CM

## 2023-07-19 RX ORDER — LISDEXAMFETAMINE DIMESYLATE 30 MG/1
30 CAPSULE ORAL EVERY MORNING
Qty: 30 CAPSULE | Refills: 0 | Status: CANCELLED | OUTPATIENT
Start: 2023-07-19

## 2023-08-01 ENCOUNTER — TELEPHONE (OUTPATIENT)
Dept: PSYCHIATRY | Facility: CLINIC | Age: 33
End: 2023-08-01
Payer: COMMERCIAL

## 2023-08-01 DIAGNOSIS — F90.2 ADHD (ATTENTION DEFICIT HYPERACTIVITY DISORDER), COMBINED TYPE: ICD-10-CM

## 2023-08-01 DIAGNOSIS — F41.9 ANXIETY: ICD-10-CM

## 2023-08-01 RX ORDER — LISDEXAMFETAMINE DIMESYLATE 30 MG/1
30 CAPSULE ORAL EVERY MORNING
Qty: 30 CAPSULE | Refills: 0 | Status: SHIPPED | OUTPATIENT
Start: 2023-08-01 | End: 2023-08-03 | Stop reason: SDUPTHER

## 2023-08-01 NOTE — TELEPHONE ENCOUNTER
Former pt of NP Pregeant requesting Vyvanse refill. L/s in March when plan made to continue med unchanged. Has f/u with new provider Dr. Flowers 9/5/23. [place] PDMP reviewed- no irregularities noted. Will confer with Dr. Cruz for review/approval one month supply of requested med.

## 2023-08-03 DIAGNOSIS — F41.9 ANXIETY: ICD-10-CM

## 2023-08-03 DIAGNOSIS — F90.2 ADHD (ATTENTION DEFICIT HYPERACTIVITY DISORDER), COMBINED TYPE: ICD-10-CM

## 2023-08-04 ENCOUNTER — PATIENT MESSAGE (OUTPATIENT)
Dept: PSYCHIATRY | Facility: CLINIC | Age: 33
End: 2023-08-04
Payer: COMMERCIAL

## 2023-08-04 RX ORDER — LISDEXAMFETAMINE DIMESYLATE 30 MG/1
30 CAPSULE ORAL EVERY MORNING
Qty: 30 CAPSULE | Refills: 0 | Status: SHIPPED | OUTPATIENT
Start: 2023-08-04 | End: 2023-09-05

## 2023-08-04 NOTE — TELEPHONE ENCOUNTER
Vyvanse script previously ordered by Dr. Cruz and sent to [facility] however pt requests sending to [facility] instead as he'd pay significantly more at [facility]. I called pharmacy and confirmed. Will confer with Dr. Cruz on review/approval of new script to be sent to pt's preferred pharmacy.

## 2023-09-05 ENCOUNTER — OFFICE VISIT (OUTPATIENT)
Dept: PSYCHIATRY | Facility: CLINIC | Age: 33
End: 2023-09-05
Payer: COMMERCIAL

## 2023-09-05 VITALS
DIASTOLIC BLOOD PRESSURE: 87 MMHG | WEIGHT: 125.88 LBS | BODY MASS INDEX: 20.32 KG/M2 | HEART RATE: 97 BPM | SYSTOLIC BLOOD PRESSURE: 136 MMHG

## 2023-09-05 DIAGNOSIS — F90.0 ATTENTION DEFICIT HYPERACTIVITY DISORDER (ADHD), PREDOMINANTLY INATTENTIVE TYPE: Primary | ICD-10-CM

## 2023-09-05 PROCEDURE — 99214 PR OFFICE/OUTPT VISIT, EST, LEVL IV, 30-39 MIN: ICD-10-PCS | Mod: S$GLB,,, | Performed by: STUDENT IN AN ORGANIZED HEALTH CARE EDUCATION/TRAINING PROGRAM

## 2023-09-05 PROCEDURE — 3008F PR BODY MASS INDEX (BMI) DOCUMENTED: ICD-10-PCS | Mod: CPTII,S$GLB,, | Performed by: STUDENT IN AN ORGANIZED HEALTH CARE EDUCATION/TRAINING PROGRAM

## 2023-09-05 PROCEDURE — 99214 OFFICE O/P EST MOD 30 MIN: CPT | Mod: S$GLB,,, | Performed by: STUDENT IN AN ORGANIZED HEALTH CARE EDUCATION/TRAINING PROGRAM

## 2023-09-05 PROCEDURE — 3079F DIAST BP 80-89 MM HG: CPT | Mod: CPTII,S$GLB,, | Performed by: STUDENT IN AN ORGANIZED HEALTH CARE EDUCATION/TRAINING PROGRAM

## 2023-09-05 PROCEDURE — 3075F SYST BP GE 130 - 139MM HG: CPT | Mod: CPTII,S$GLB,, | Performed by: STUDENT IN AN ORGANIZED HEALTH CARE EDUCATION/TRAINING PROGRAM

## 2023-09-05 PROCEDURE — 99999 PR PBB SHADOW E&M-EST. PATIENT-LVL II: CPT | Mod: PBBFAC,,, | Performed by: STUDENT IN AN ORGANIZED HEALTH CARE EDUCATION/TRAINING PROGRAM

## 2023-09-05 PROCEDURE — 3075F PR MOST RECENT SYSTOLIC BLOOD PRESS GE 130-139MM HG: ICD-10-PCS | Mod: CPTII,S$GLB,, | Performed by: STUDENT IN AN ORGANIZED HEALTH CARE EDUCATION/TRAINING PROGRAM

## 2023-09-05 PROCEDURE — 99999 PR PBB SHADOW E&M-EST. PATIENT-LVL II: ICD-10-PCS | Mod: PBBFAC,,, | Performed by: STUDENT IN AN ORGANIZED HEALTH CARE EDUCATION/TRAINING PROGRAM

## 2023-09-05 PROCEDURE — 3079F PR MOST RECENT DIASTOLIC BLOOD PRESSURE 80-89 MM HG: ICD-10-PCS | Mod: CPTII,S$GLB,, | Performed by: STUDENT IN AN ORGANIZED HEALTH CARE EDUCATION/TRAINING PROGRAM

## 2023-09-05 PROCEDURE — 3008F BODY MASS INDEX DOCD: CPT | Mod: CPTII,S$GLB,, | Performed by: STUDENT IN AN ORGANIZED HEALTH CARE EDUCATION/TRAINING PROGRAM

## 2023-09-05 RX ORDER — LISDEXAMFETAMINE DIMESYLATE 30 MG/1
15 TABLET, CHEWABLE ORAL DAILY
Qty: 30 TABLET | Refills: 0 | Status: SHIPPED | OUTPATIENT
Start: 2023-09-05 | End: 2023-09-07

## 2023-09-05 RX ORDER — LISDEXAMFETAMINE DIMESYLATE 30 MG/1
15 TABLET, CHEWABLE ORAL DAILY
Qty: 30 TABLET | Refills: 0 | Status: SHIPPED | OUTPATIENT
Start: 2023-10-03 | End: 2023-09-07
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
## 2023-09-06 ENCOUNTER — PATIENT MESSAGE (OUTPATIENT)
Dept: PSYCHIATRY | Facility: CLINIC | Age: 33
End: 2023-09-06
Payer: COMMERCIAL

## 2023-09-06 NOTE — PROGRESS NOTES
I have reviewed the notes, assessments, and/or procedures performed by Dr. Flowers, I concur with her/his documentation of Kei Sharma.

## 2023-09-07 DIAGNOSIS — F98.8 ATTENTION DEFICIT DISORDER, UNSPECIFIED HYPERACTIVITY PRESENCE: Primary | ICD-10-CM

## 2023-09-07 RX ORDER — LISDEXAMFETAMINE DIMESYLATE CAPSULES 20 MG/1
20 CAPSULE ORAL EVERY MORNING
Qty: 30 CAPSULE | Refills: 0 | Status: SHIPPED | OUTPATIENT
Start: 2023-09-07 | End: 2023-09-08

## 2023-09-07 RX ORDER — LISDEXAMFETAMINE DIMESYLATE CAPSULES 20 MG/1
20 CAPSULE ORAL EVERY MORNING
Qty: 30 CAPSULE | Refills: 0 | Status: SHIPPED | OUTPATIENT
Start: 2023-11-02 | End: 2023-09-08

## 2023-09-07 RX ORDER — LISDEXAMFETAMINE DIMESYLATE CAPSULES 20 MG/1
20 CAPSULE ORAL EVERY MORNING
Qty: 30 CAPSULE | Refills: 0 | Status: SHIPPED | OUTPATIENT
Start: 2023-10-05 | End: 2023-09-08

## 2023-09-08 DIAGNOSIS — F90.0 ATTENTION DEFICIT HYPERACTIVITY DISORDER (ADHD), PREDOMINANTLY INATTENTIVE TYPE: Primary | ICD-10-CM

## 2023-09-08 RX ORDER — LISDEXAMFETAMINE DIMESYLATE CAPSULES 20 MG/1
20 CAPSULE ORAL EVERY MORNING
Qty: 30 CAPSULE | Refills: 0 | Status: SHIPPED | OUTPATIENT
Start: 2023-09-08 | End: 2023-09-14

## 2023-09-08 RX ORDER — LISDEXAMFETAMINE DIMESYLATE CAPSULES 20 MG/1
20 CAPSULE ORAL EVERY MORNING
Qty: 30 CAPSULE | Refills: 0 | Status: SHIPPED | OUTPATIENT
Start: 2023-11-03 | End: 2023-09-14

## 2023-09-08 RX ORDER — LISDEXAMFETAMINE DIMESYLATE CAPSULES 20 MG/1
20 CAPSULE ORAL EVERY MORNING
Qty: 30 CAPSULE | Refills: 0 | Status: SHIPPED | OUTPATIENT
Start: 2023-10-06 | End: 2023-09-14

## 2023-09-14 DIAGNOSIS — F90.0 ATTENTION DEFICIT HYPERACTIVITY DISORDER (ADHD), PREDOMINANTLY INATTENTIVE TYPE: Primary | ICD-10-CM

## 2023-09-14 RX ORDER — LISDEXAMFETAMINE DIMESYLATE CAPSULES 20 MG/1
20 CAPSULE ORAL EVERY MORNING
Qty: 30 CAPSULE | Refills: 0 | Status: SHIPPED | OUTPATIENT
Start: 2023-09-14 | End: 2023-12-20

## 2023-09-14 RX ORDER — LISDEXAMFETAMINE DIMESYLATE CAPSULES 20 MG/1
20 CAPSULE ORAL EVERY MORNING
Qty: 30 CAPSULE | Refills: 0 | Status: SHIPPED | OUTPATIENT
Start: 2023-11-09 | End: 2023-10-25

## 2023-09-14 RX ORDER — LISDEXAMFETAMINE DIMESYLATE CAPSULES 20 MG/1
20 CAPSULE ORAL EVERY MORNING
Qty: 30 CAPSULE | Refills: 0 | Status: SHIPPED | OUTPATIENT
Start: 2023-10-12 | End: 2023-10-25

## 2023-09-15 DIAGNOSIS — F90.0 ATTENTION DEFICIT HYPERACTIVITY DISORDER (ADHD), PREDOMINANTLY INATTENTIVE TYPE: ICD-10-CM

## 2023-09-15 RX ORDER — LISDEXAMFETAMINE DIMESYLATE CAPSULES 20 MG/1
20 CAPSULE ORAL EVERY MORNING
Qty: 30 CAPSULE | Refills: 0 | Status: CANCELLED | OUTPATIENT
Start: 2023-09-15

## 2023-10-17 ENCOUNTER — PATIENT MESSAGE (OUTPATIENT)
Dept: PSYCHIATRY | Facility: CLINIC | Age: 33
End: 2023-10-17
Payer: COMMERCIAL

## 2023-10-24 DIAGNOSIS — F90.0 ATTENTION DEFICIT HYPERACTIVITY DISORDER (ADHD), PREDOMINANTLY INATTENTIVE TYPE: ICD-10-CM

## 2023-10-24 RX ORDER — LISDEXAMFETAMINE DIMESYLATE CAPSULES 20 MG/1
20 CAPSULE ORAL EVERY MORNING
Qty: 30 CAPSULE | Refills: 0 | Status: CANCELLED | OUTPATIENT
Start: 2023-10-24

## 2023-10-25 DIAGNOSIS — F90.0 ATTENTION DEFICIT HYPERACTIVITY DISORDER (ADHD), PREDOMINANTLY INATTENTIVE TYPE: Primary | ICD-10-CM

## 2023-10-25 RX ORDER — LISDEXAMFETAMINE DIMESYLATE CAPSULES 20 MG/1
20 CAPSULE ORAL EVERY MORNING
Qty: 30 CAPSULE | Refills: 0 | Status: SHIPPED | OUTPATIENT
Start: 2023-10-25 | End: 2023-10-30

## 2023-10-25 RX ORDER — LISDEXAMFETAMINE DIMESYLATE CAPSULES 20 MG/1
20 CAPSULE ORAL EVERY MORNING
Qty: 30 CAPSULE | Refills: 0 | Status: SHIPPED | OUTPATIENT
Start: 2023-11-22 | End: 2023-10-30

## 2023-10-26 DIAGNOSIS — F90.0 ATTENTION DEFICIT HYPERACTIVITY DISORDER (ADHD), PREDOMINANTLY INATTENTIVE TYPE: ICD-10-CM

## 2023-10-26 RX ORDER — LISDEXAMFETAMINE DIMESYLATE CAPSULES 20 MG/1
20 CAPSULE ORAL EVERY MORNING
Qty: 30 CAPSULE | Refills: 0 | Status: CANCELLED | OUTPATIENT
Start: 2023-10-26

## 2023-10-30 RX ORDER — LISDEXAMFETAMINE DIMESYLATE CAPSULES 20 MG/1
20 CAPSULE ORAL EVERY MORNING
Qty: 30 CAPSULE | Refills: 0 | Status: SHIPPED | OUTPATIENT
Start: 2023-10-30

## 2023-10-30 RX ORDER — LISDEXAMFETAMINE DIMESYLATE CAPSULES 20 MG/1
20 CAPSULE ORAL EVERY MORNING
Qty: 30 CAPSULE | Refills: 0 | Status: SHIPPED | OUTPATIENT
Start: 2023-11-27 | End: 2023-12-20

## 2023-10-30 RX ORDER — LISDEXAMFETAMINE DIMESYLATE CAPSULES 20 MG/1
20 CAPSULE ORAL EVERY MORNING
Qty: 30 CAPSULE | Refills: 0 | Status: SHIPPED | OUTPATIENT
Start: 2023-12-25 | End: 2023-12-20

## 2023-10-31 ENCOUNTER — PATIENT MESSAGE (OUTPATIENT)
Dept: PSYCHIATRY | Facility: CLINIC | Age: 33
End: 2023-10-31
Payer: COMMERCIAL

## 2023-10-31 RX ORDER — LISDEXAMFETAMINE DIMESYLATE CAPSULES 20 MG/1
20 CAPSULE ORAL EVERY MORNING
Qty: 30 CAPSULE | Refills: 0 | Status: CANCELLED | OUTPATIENT
Start: 2023-10-31

## 2023-12-20 ENCOUNTER — OFFICE VISIT (OUTPATIENT)
Dept: PSYCHIATRY | Facility: CLINIC | Age: 33
End: 2023-12-20
Payer: COMMERCIAL

## 2023-12-20 VITALS
BODY MASS INDEX: 19.25 KG/M2 | SYSTOLIC BLOOD PRESSURE: 148 MMHG | DIASTOLIC BLOOD PRESSURE: 95 MMHG | WEIGHT: 119.25 LBS | HEART RATE: 108 BPM

## 2023-12-20 DIAGNOSIS — F90.0 ATTENTION DEFICIT HYPERACTIVITY DISORDER (ADHD), PREDOMINANTLY INATTENTIVE TYPE: Primary | ICD-10-CM

## 2023-12-20 PROCEDURE — 3008F PR BODY MASS INDEX (BMI) DOCUMENTED: ICD-10-PCS | Mod: CPTII,S$GLB,, | Performed by: STUDENT IN AN ORGANIZED HEALTH CARE EDUCATION/TRAINING PROGRAM

## 2023-12-20 PROCEDURE — 3080F PR MOST RECENT DIASTOLIC BLOOD PRESSURE >= 90 MM HG: ICD-10-PCS | Mod: CPTII,S$GLB,, | Performed by: STUDENT IN AN ORGANIZED HEALTH CARE EDUCATION/TRAINING PROGRAM

## 2023-12-20 PROCEDURE — 3008F BODY MASS INDEX DOCD: CPT | Mod: CPTII,S$GLB,, | Performed by: STUDENT IN AN ORGANIZED HEALTH CARE EDUCATION/TRAINING PROGRAM

## 2023-12-20 PROCEDURE — 3077F SYST BP >= 140 MM HG: CPT | Mod: CPTII,S$GLB,, | Performed by: STUDENT IN AN ORGANIZED HEALTH CARE EDUCATION/TRAINING PROGRAM

## 2023-12-20 PROCEDURE — 99999 PR PBB SHADOW E&M-EST. PATIENT-LVL II: ICD-10-PCS | Mod: PBBFAC,,, | Performed by: STUDENT IN AN ORGANIZED HEALTH CARE EDUCATION/TRAINING PROGRAM

## 2023-12-20 PROCEDURE — 99213 OFFICE O/P EST LOW 20 MIN: CPT | Mod: S$GLB,,, | Performed by: STUDENT IN AN ORGANIZED HEALTH CARE EDUCATION/TRAINING PROGRAM

## 2023-12-20 PROCEDURE — 99999 PR PBB SHADOW E&M-EST. PATIENT-LVL II: CPT | Mod: PBBFAC,,, | Performed by: STUDENT IN AN ORGANIZED HEALTH CARE EDUCATION/TRAINING PROGRAM

## 2023-12-20 PROCEDURE — 3080F DIAST BP >= 90 MM HG: CPT | Mod: CPTII,S$GLB,, | Performed by: STUDENT IN AN ORGANIZED HEALTH CARE EDUCATION/TRAINING PROGRAM

## 2023-12-20 PROCEDURE — 99213 PR OFFICE/OUTPT VISIT, EST, LEVL III, 20-29 MIN: ICD-10-PCS | Mod: S$GLB,,, | Performed by: STUDENT IN AN ORGANIZED HEALTH CARE EDUCATION/TRAINING PROGRAM

## 2023-12-20 PROCEDURE — 3077F PR MOST RECENT SYSTOLIC BLOOD PRESSURE >= 140 MM HG: ICD-10-PCS | Mod: CPTII,S$GLB,, | Performed by: STUDENT IN AN ORGANIZED HEALTH CARE EDUCATION/TRAINING PROGRAM

## 2023-12-20 RX ORDER — LISDEXAMFETAMINE DIMESYLATE 30 MG/1
30 CAPSULE ORAL EVERY MORNING
Qty: 30 CAPSULE | Refills: 0 | Status: SHIPPED | OUTPATIENT
Start: 2024-02-14

## 2023-12-20 RX ORDER — LISDEXAMFETAMINE DIMESYLATE 30 MG/1
30 CAPSULE ORAL EVERY MORNING
Qty: 30 CAPSULE | Refills: 0 | Status: SHIPPED | OUTPATIENT
Start: 2023-12-20

## 2023-12-20 RX ORDER — LISDEXAMFETAMINE DIMESYLATE 30 MG/1
30 CAPSULE ORAL EVERY MORNING
Qty: 30 CAPSULE | Refills: 0 | Status: SHIPPED | OUTPATIENT
Start: 2024-01-17

## 2023-12-20 NOTE — PROGRESS NOTES
Outpatient Psychiatry Follow-Up Visit (MD/NP)    12/20/2023    Clinical Status of Patient:  Outpatient (Ambulatory)    Chief Complaint:  Kei Sharma is a 33 y.o. male who presents today for follow-up of attention problems. Met with patient.      Interval History and Content of Current Session:  Interim Events/Subjective Report/Content of Current Session: Doing okay lately. Has been taking vyvanse 20mg's for the past few months now, works fine. Has been having a lot of stress over the past few months - prepping for 's exam, didn't past it once. Training for marathon and got peroneal tendonitis. Has noticed more so when medication is working or not. In October, had some issues with getting rx. Still had some 30's leftover and seemed to be better than previous and improved crash. No chest pain, tightness. Sleep has been less than preferred, mostly due to working long hours. Has been taking some melatonin before bedtime. Still doing well with eating - setting a schedule, logging his habits, etc.     Psychiatric Review of Systems-is patient experiencing or having changes in  sleep: yes, due to work  appetite: no  weight: no  energy/anergy: no  anhedonia: no  libido: did not assess  anxiety: yes  guilty/hopelessness: no  concentration: no  Irritability: no  Paranoia/delusions: no  S.I.B.s/risky behavior: no    Review of Systems   PSYCHIATRIC: Pertinant items are noted in the narrative.  CONSTITUTIONAL: No weight gain or loss.  MUSCULOSKELETAL: No pain or stiffness of the joints.  NEUROLOGIC: No weakness, sensory changes, seizures, confusion, memory loss, tremor or other abnormal movements.  RESPIRATORY: No shortness of breath.  CARDIOVASCULAR: No tachycardia or chest pain.  GASTROINTESTINAL: No nausea, vomiting, pain, constipation or diarrhea.    Past Medical, Family and Social History: The patient's past medical, family and social history have been reviewed and updated as appropriate  within the electronic medical record - see encounter notes.    Compliance: yes    Side effects: None    Risk Parameters:  Patient reports no suicidal ideation  Patient reports no homicidal ideation  Patient reports no self-injurious behavior  Patient reports no violent behavior    Exam (detailed: at least 9 elements; comprehensive: all 15 elements)   Constitutional  Vitals:  Most recent vital signs, dated less than 90 days prior to this appointment, were reviewed.   Vitals:    12/20/23 1029   BP: (!) 148/95   Pulse: 108   Weight: 54.1 kg (119 lb 4.3 oz)        General:  unremarkable, age appropriate     Musculoskeletal  Muscle Strength/Tone:  not examined   Gait & Station:  non-ataxic     Psychiatric  Speech:  no latency; no press   Mood & Affect:  euthymic  congruent and appropriate   Thought Process:  normal and logical   Associations:  intact   Thought Content:  no suicidality, no homicidality, delusions, or paranoia   Insight:  intact, has awareness of illness   Judgement: behavior is adequate to circumstances   Orientation:  grossly intact   Memory: intact for content of interview   Language: grossly intact   Attention Span & Concentration:  able to focus   Fund of Knowledge:  intact and appropriate to age and level of education     Assessment and Diagnosis   Status/Progress: Based on the examination today, the patient's problem(s) is/are well controlled.  New problems have not been presented today.   Co-morbidities are not complicating management of the primary condition.  There are no active rule-out diagnoses for this patient at this time.     General Impression:    ICD-10-CM ICD-9-CM   1. Attention deficit hyperactivity disorder (ADHD), predominantly inattentive type  F90.0 314.00       Intervention/Counseling/Treatment Plan   Medication Management: Continue current medications -  vyvanse 30mg daily. The risks and benefits of medication were discussed with the patient.  The treatment plan and follow up plan  were reviewed with the patient.  Continue to monitor for sx's of eating disorder, currently doing well of medication as well as food diary   reviewed: no discrepancies noted    Discussed with patient informed consent including diagnosis, risks and benefits of proposed treatment above vs. alternative treatments vs. no treatment, as well as serious and common side effects of these treatments, and the inherent unpredictability of individual responses to these treatments. The patient expresses understanding of the above and displays the capacity to agree with this current plan. Patient also agrees that, currently, the benefits outweigh the risks and would like to pursue treatment at this time, and had no other questions.    Instructions:  Take all medications as prescribed.    Abstain from recreational drugs and alcohol.  Present to ED or call 911 for SI/HI plan or intent, psychosis, or medical emergency.    Return to Clinic: 3 months    Case to be discussed with supervising staff, Dr. Phuong MD.    Angel Flowers MD  [facility] Psychiatry PGY-III

## 2023-12-26 NOTE — PROGRESS NOTES
STAFF NOTE    The chart was reviewed and the case was discussed, including the assessment and management plan.  I agree with the overall findings, with corrections or clarifications, if any, noted herein.    **    Farshad Baca MD  Board Certification: Psychiatry and Addiction Medicine

## 2024-01-25 RX ORDER — LISDEXAMFETAMINE DIMESYLATE 30 MG/1
30 CAPSULE ORAL EVERY MORNING
Qty: 30 CAPSULE | Refills: 0 | Status: CANCELLED | OUTPATIENT
Start: 2024-01-25

## 2024-02-02 ENCOUNTER — OFFICE VISIT (OUTPATIENT)
Dept: DERMATOLOGY | Facility: CLINIC | Age: 34
End: 2024-02-02
Payer: COMMERCIAL

## 2024-02-02 DIAGNOSIS — L81.4 LENTIGO: Primary | ICD-10-CM

## 2024-02-02 DIAGNOSIS — Z12.83 SCREENING EXAM FOR SKIN CANCER: ICD-10-CM

## 2024-02-02 DIAGNOSIS — D22.9 MULTIPLE BENIGN NEVI: ICD-10-CM

## 2024-02-02 PROCEDURE — 1159F MED LIST DOCD IN RCRD: CPT | Mod: CPTII,S$GLB,, | Performed by: STUDENT IN AN ORGANIZED HEALTH CARE EDUCATION/TRAINING PROGRAM

## 2024-02-02 PROCEDURE — 99213 OFFICE O/P EST LOW 20 MIN: CPT | Mod: S$GLB,,, | Performed by: STUDENT IN AN ORGANIZED HEALTH CARE EDUCATION/TRAINING PROGRAM

## 2024-02-02 PROCEDURE — 1160F RVW MEDS BY RX/DR IN RCRD: CPT | Mod: CPTII,S$GLB,, | Performed by: STUDENT IN AN ORGANIZED HEALTH CARE EDUCATION/TRAINING PROGRAM

## 2024-02-02 PROCEDURE — 99999 PR PBB SHADOW E&M-EST. PATIENT-LVL III: CPT | Mod: PBBFAC,,, | Performed by: STUDENT IN AN ORGANIZED HEALTH CARE EDUCATION/TRAINING PROGRAM

## 2024-02-02 NOTE — PROGRESS NOTES
Subjective:      Patient ID:  Kei Sharma is a 34 y.o. male who presents for   Chief Complaint   Patient presents with    Skin Check     Pt here today for UBSE.     Pt last seen on 2/6/2023 for lesion on nose. Resolved     No hx skin cancer.     Concerned about spot on forehead and back.     H/o actinic keratosis on nasal bridge    Review of Systems   Hematologic/Lymphatic: Does not bruise/bleed easily.       Objective:   Physical Exam   Constitutional: He appears well-developed and well-nourished. No distress.   Neurological: He is alert and oriented to person, place, and time. He is not disoriented.   Psychiatric: He has a normal mood and affect.   Skin:   Areas Examined (abnormalities noted in diagram):   Scalp / Hair Palpated and Inspected  Head / Face Inspection Performed  Neck Inspection Performed  Chest / Axilla Inspection Performed  Abdomen Inspection Performed  Back Inspection Performed  RUE Inspected  LUE Inspection Performed                 Diagram Legend     Erythematous scaling macule/papule c/w actinic keratosis       Vascular papule c/w angioma      Pigmented verrucoid papule/plaque c/w seborrheic keratosis      Yellow umbilicated papule c/w sebaceous hyperplasia      Irregularly shaped tan macule c/w lentigo     1-2 mm smooth white papules consistent with Milia      Movable subcutaneous cyst with punctum c/w epidermal inclusion cyst      Subcutaneous movable cyst c/w pilar cyst      Firm pink to brown papule c/w dermatofibroma      Pedunculated fleshy papule(s) c/w skin tag(s)      Evenly pigmented macule c/w junctional nevus     Mildly variegated pigmented, slightly irregular-bordered macule c/w mildly atypical nevus      Flesh colored to evenly pigmented papule c/w intradermal nevus       Pink pearly papule/plaque c/w basal cell carcinoma      Erythematous hyperkeratotic cursted plaque c/w SCC      Surgical scar with no sign of skin cancer recurrence      Open and closed comedones       Inflammatory papules and pustules      Verrucoid papule consistent consistent with wart     Erythematous eczematous patches and plaques     Dystrophic onycholytic nail with subungual debris c/w onychomycosis     Umbilicated papule    Erythematous-base heme-crusted tan verrucoid plaque consistent with inflamed seborrheic keratosis     Erythematous Silvery Scaling Plaque c/w Psoriasis     See annotation      Assessment / Plan:        Lentigo  Reassurance given to patient. No treatment is necessary.     Multiple benign nevi  Reassurance given to patient. No treatment is necessary.     History of AK  Screening exam for skin cancer  Upper body skin examination performed today including at least 6 points as noted in physical examination. No lesions suspicious for malignancy noted.    Recommend daily sun protection/avoidance and use of at least SPF 30, broad spectrum sunscreen (OTC drug).              Follow up in about 1 year (around 2/2/2025) for TBSE.

## 2024-02-02 NOTE — PATIENT INSTRUCTIONS
Sunscreen Guidelines  Sunscreen protects your skin by absorbing and reflecting ultraviolet rays. All sunscreens have a sun protection factor (SPF) rating that indicates how long a sunscreen will remain effective on the skin.    Why protect your skin?  The sun's rays are composed of many different wavelengths, including UVA, UVB, and visible light that each affect the skin differently.    UVB: sunburn, photoaging, skin cancer (melanoma, basal cell, and squamous cell carcinomas) and modulation of the skin's immune system.    UVA: similar to above but thought to contribute more to aging; at the same dose of UVB it is less powerful however the sun has 10-20 times the levels of UVA as compared with UVB.  Visible light: implicated in causing unwanted darkening of skin, such as melasma and post-inflammatory hyperpigmentation in darker skin types     If I have dark skin, do I need to worry about the sun?    More darkly pigmented skin is more protected against UV-induced skin cancer, sunburn, and photoaging, though may still suffer from sun-related conditions, including melasma, hyperpigmentation, and other dark spots.    Sun avoidance  As a general rule, stay out of the sun as much as possible between 10 a.m. - 4 p.m.    Download the EPA UV index mariama to track the UV index by hour in your zip code.      Which sunscreen should I choose?  The best sunscreen to use varies by individual. The one that feels best on your skin and fits your lifestyle will be the one you will likely use most regularly.   Active ingredients of sunscreen vary by , and may be a chemical (such as avobenzone or oxybenzone) or physical agent (such as zinc oxide or titanium dioxide). I recommend a physical agent.  A water-resistant sunscreen is one that maintains the SPF level after 40 minutes of water exposure. A very water-resistant sunscreen maintains the SPF level after 80 minutes of water exposure.    Sunscreen: this is the last layer in  "sun protection   Be generous: 1 shot glass of sunscreen for your body, ½ teaspoon for your face/neck  Reapply every 2 hours  Broad spectrum (provides UVA/UVB protection), SPF 50 or above  Avoid spray sunscreens: less effective and have been found to contain benzene (carcinogen)    Sun protective clothing  Although sunscreen helps minimize sun damage, no sunscreen completely blocks all wavelengths of UV light. Wearing sun protective clothing such as hats, rashguards or swim shirts, and long sleeves and/or pants, as well as avoiding sun exposure from 10 a.m. to 4 p.m. will help protect your skin from overexposure and minimize sun damage. Seek shade.  Long sleeved clothing, hats, and sunglasses: makes sun protection easier, more effective, and can even be more affordable, since sunscreen needs to be reapplied frequently.    Solumbra (www.sunprectautions.Relux)  Tiller (www.MarketArt)  Coolibar (www.[employer].Relux)  Land's end (www.HomeTouch)  Hats from Fabiola Debteye (www.helenkaminski.com)    My Favorite Sunscreens:  Physical blockers: Can have a "white case" but in general are more effective  - Face: CeraVe tinted mineral sunscreen, Bare Minerals complexion rescue (20 shades), Elta MD (UV elements, UV physical, UV restore, etc), Tizo ultra zinc tinted, Cetaphil Sheer Mineral Face Liquid Sunscreen  - Body: Blue Lizard, Neutrogena Sheer Zinc, Eucerin Daily Protection, Aveeno Baby   "

## 2024-03-18 NOTE — PATIENT INSTRUCTIONS
Please call the department at [phone] to schedule a nurse visit for a urine drug screen with 48 to 72 hours of this visit - this can be completed at the 4th floor of the [facility] in the psychiatry department.     Please initial and sign the controlled substance agreement form that I have sent to you as well. Any further questions or concerns regarding this please let me know.             Please complete vital signs while you are there.   [phone]

## 2024-05-08 ENCOUNTER — OFFICE VISIT (OUTPATIENT)
Dept: PSYCHIATRY | Facility: CLINIC | Age: 34
End: 2024-05-08
Payer: COMMERCIAL

## 2024-05-08 VITALS
HEART RATE: 68 BPM | BODY MASS INDEX: 20.83 KG/M2 | SYSTOLIC BLOOD PRESSURE: 139 MMHG | WEIGHT: 129.06 LBS | DIASTOLIC BLOOD PRESSURE: 93 MMHG

## 2024-05-08 DIAGNOSIS — F90.0 ATTENTION DEFICIT HYPERACTIVITY DISORDER (ADHD), PREDOMINANTLY INATTENTIVE TYPE: Primary | ICD-10-CM

## 2024-05-08 PROCEDURE — 3008F BODY MASS INDEX DOCD: CPT | Mod: CPTII,S$GLB,, | Performed by: STUDENT IN AN ORGANIZED HEALTH CARE EDUCATION/TRAINING PROGRAM

## 2024-05-08 PROCEDURE — 99999 PR PBB SHADOW E&M-EST. PATIENT-LVL II: CPT | Mod: PBBFAC,,, | Performed by: STUDENT IN AN ORGANIZED HEALTH CARE EDUCATION/TRAINING PROGRAM

## 2024-05-08 PROCEDURE — 3075F SYST BP GE 130 - 139MM HG: CPT | Mod: CPTII,S$GLB,, | Performed by: STUDENT IN AN ORGANIZED HEALTH CARE EDUCATION/TRAINING PROGRAM

## 2024-05-08 PROCEDURE — 3080F DIAST BP >= 90 MM HG: CPT | Mod: CPTII,S$GLB,, | Performed by: STUDENT IN AN ORGANIZED HEALTH CARE EDUCATION/TRAINING PROGRAM

## 2024-05-08 PROCEDURE — 99213 OFFICE O/P EST LOW 20 MIN: CPT | Mod: S$GLB,,, | Performed by: STUDENT IN AN ORGANIZED HEALTH CARE EDUCATION/TRAINING PROGRAM

## 2024-05-08 RX ORDER — LISDEXAMFETAMINE DIMESYLATE 30 MG/1
30 CAPSULE ORAL EVERY MORNING
Qty: 30 CAPSULE | Refills: 0 | Status: SHIPPED | OUTPATIENT
Start: 2024-05-08

## 2024-05-08 RX ORDER — LISDEXAMFETAMINE DIMESYLATE 30 MG/1
30 CAPSULE ORAL EVERY MORNING
Qty: 30 CAPSULE | Refills: 0 | Status: SHIPPED | OUTPATIENT
Start: 2024-07-03

## 2024-05-08 RX ORDER — LISDEXAMFETAMINE DIMESYLATE 30 MG/1
30 CAPSULE ORAL EVERY MORNING
Qty: 30 CAPSULE | Refills: 0 | Status: SHIPPED | OUTPATIENT
Start: 2024-06-05

## 2024-05-08 NOTE — PROGRESS NOTES
Outpatient Psychiatry Follow-Up Visit (MD/NP)    5/8/2024    Clinical Status of Patient:  Outpatient (Ambulatory)    Chief Complaint:  Kei Sharma is a 34 y.o. male who presents today for follow-up of attention problems. Met with patient.      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 Hasn't been able to fill rx over past 2 months. Appetite has been good, more urges relating to bulimia but has been stable. Does better when on medications, access has been an issue. Running injury has improved.    Psychiatric Review of Systems-is patient experiencing or having changes in  sleep: no  appetite: no  weight: no  energy/anergy: no  anhedonia: no  libido: did not assess  anxiety: yes  guilty/hopelessness: no  concentration: no  Irritability: no  Paranoia/delusions: no  S.I.B.s/risky behavior: no    Review of Systems   PSYCHIATRIC: Pertinant items are noted in the narrative.  CONSTITUTIONAL: No weight gain or loss.  MUSCULOSKELETAL: No pain or stiffness of the joints.  NEUROLOGIC: No weakness, sensory changes, seizures, confusion, memory loss, tremor or other abnormal movements.  RESPIRATORY: No shortness of breath.  CARDIOVASCULAR: No tachycardia or chest pain.  GASTROINTESTINAL: No nausea, vomiting, pain, constipation or diarrhea.    Past Medical, Family and Social History: The patient's past medical, family and social history have been reviewed and updated as appropriate within the electronic medical record - see encounter notes.    Compliance: yes    Side effects: None    Risk Parameters:  Patient reports no suicidal ideation  Patient reports no homicidal ideation  Patient reports no self-injurious behavior  Patient reports no violent behavior    Exam (detailed: at least 9 elements; comprehensive: all 15 elements)   Constitutional  Vitals:  Most recent vital signs, dated  less than 90 days prior to this appointment, were reviewed.   Vitals:    05/08/24 1133   BP: (!) 139/93   Pulse: 68   Weight: 58.6 kg (129 lb 1.3 oz)      General:  unremarkable, age appropriate     Musculoskeletal  Muscle Strength/Tone:  not examined   Gait & Station:  non-ataxic     Psychiatric  Speech:  no latency; no press   Mood & Affect:  euthymic  congruent and appropriate   Thought Process:  normal and logical   Associations:  intact   Thought Content:  no suicidality, no homicidality, delusions, or paranoia   Insight:  intact, has awareness of illness   Judgement: behavior is adequate to circumstances   Orientation:  grossly intact   Memory: intact for content of interview   Language: grossly intact   Attention Span & Concentration:  able to focus   Fund of Knowledge:  intact and appropriate to age and level of education     Assessment and Diagnosis   Status/Progress: Based on the examination today, the patient's problem(s) is/are well controlled.  New problems have not been presented today.   Co-morbidities are not complicating management of the primary condition.  There are no active rule-out diagnoses for this patient at this time.     General Impression:    ICD-10-CM ICD-9-CM   1. Attention deficit hyperactivity disorder (ADHD), predominantly inattentive type  F90.0 314.00     H/o Bulimia Nervosa    Intervention/Counseling/Treatment Plan   Medication Management: Continue current medications -  vyvanse 30mg daily. The risks and benefits of medication were discussed with the patient.  Discussed potentially transitioning to dyanavel via [place] [address] pharmacy for easier access if desired. He will defer for now but may consider if shortage continues to be an issue  The treatment plan and follow up plan were reviewed with the patient.  Continue to monitor for sx's of eating disorder, currently doing well on medication as well as food diary  Discussed resident transition   reviewed: no discrepancies  noted    Discussed with patient informed consent including diagnosis, risks and benefits of proposed treatment above vs. alternative treatments vs. no treatment, as well as serious and common side effects of these treatments, and the inherent unpredictability of individual responses to these treatments. The patient expresses understanding of the above and displays the capacity to agree with this current plan. Patient also agrees that, currently, the benefits outweigh the risks and would like to pursue treatment at this time, and had no other questions.    Instructions:  Take all medications as prescribed.    Abstain from recreational drugs and alcohol.  Present to ED or call 911 for SI/HI plan or intent, psychosis, or medical emergency.    Return to Clinic: 3 months    Case to be discussed with supervising staff, Dr. Phuong MD.    Angel Flowers MD  [facility] PGY-III

## 2024-06-30 ENCOUNTER — HOSPITAL ENCOUNTER (EMERGENCY)
Facility: HOSPITAL | Age: 34
Discharge: HOME | End: 2024-06-30
Attending: EMERGENCY MEDICINE
Payer: COMMERCIAL

## 2024-06-30 VITALS
TEMPERATURE: 98 F | WEIGHT: 125 LBS | DIASTOLIC BLOOD PRESSURE: 71 MMHG | OXYGEN SATURATION: 98 % | RESPIRATION RATE: 18 BRPM | HEART RATE: 93 BPM | HEIGHT: 65 IN | BODY MASS INDEX: 20.83 KG/M2 | SYSTOLIC BLOOD PRESSURE: 121 MMHG

## 2024-06-30 DIAGNOSIS — S92.301A CLOSED DISPLACED FRACTURE OF METATARSAL BONE OF RIGHT FOOT, UNSPECIFIED METATARSAL, INITIAL ENCOUNTER: Primary | ICD-10-CM

## 2024-06-30 DIAGNOSIS — S99.929A FOOT INJURY: ICD-10-CM

## 2024-06-30 PROCEDURE — 25000003 PHARM REV CODE 250

## 2024-06-30 PROCEDURE — 99283 EMERGENCY DEPT VISIT LOW MDM: CPT | Mod: 25

## 2024-06-30 RX ORDER — NAPROXEN 500 MG/1
500 TABLET ORAL
Status: COMPLETED | OUTPATIENT
Start: 2024-06-30 | End: 2024-06-30

## 2024-06-30 RX ORDER — NAPROXEN 500 MG/1
500 TABLET ORAL 2 TIMES DAILY WITH MEALS
Qty: 20 TABLET | Refills: 0 | Status: SHIPPED | OUTPATIENT
Start: 2024-06-30 | End: 2024-07-10

## 2024-06-30 RX ORDER — HYDROCODONE BITARTRATE AND ACETAMINOPHEN 5; 325 MG/1; MG/1
1 TABLET ORAL
Status: DISCONTINUED | OUTPATIENT
Start: 2024-06-30 | End: 2024-06-30

## 2024-06-30 RX ADMIN — NAPROXEN 500 MG: 500 TABLET ORAL at 04:06

## 2024-06-30 NOTE — ED NOTES
Pt. Was educated on crutch and boot walking before discharge by RN after boot and crutches were applied to Pt.

## 2024-06-30 NOTE — ED TRIAGE NOTES
Kei Barrett Sharma, a 34 y.o. male presents to the ED w/ complaint of right foot injury. Pt reports a 45lb olympic weight lifting bar fell on right foot. Swelling noted to right foot. Pain 6/10. Pt able to move all toes, unable to bear weight. Small skin tear noted to top of foot.     Triage note:  Chief Complaint   Patient presents with    Foot Injury     Pt states a 45lb weight lifting bar fell on his right foot about 45 mins ago. Pt denies numbness or tingling in toes, swelling noted to top of right foot     Review of patient's allergies indicates:  No Known Allergies  No past medical history on file.

## 2024-06-30 NOTE — ED PROVIDER NOTES
Encounter Date: 6/30/2024       History     Chief Complaint   Patient presents with    Foot Injury     Pt states a 45lb weight lifting bar fell on his right foot about 45 mins ago. Pt denies numbness or tingling in toes, swelling noted to top of right foot     34-year-old male with no medical problems now presenting with right foot pain following dropping a weight on his right foot.  Patient reports that him and a friend were passing a 50 lb weight back and forth right foot.  Patient endorses pain dorsal aspect of his foot with difficulty ambulating but denies any other associated injuries, numbness/weakness of his foot, or any bleeding.    The history is provided by the patient.     Review of patient's allergies indicates:  No Known Allergies  History reviewed. No pertinent past medical history.  History reviewed. No pertinent surgical history.  Family History   Problem Relation Name Age of Onset    No Known Problems Mother      No Known Problems Father       Social History     Tobacco Use    Smoking status: Never    Smokeless tobacco: Never   Substance Use Topics    Alcohol use: Yes    Drug use: Never     Review of Systems  See HPI     Physical Exam     Initial Vitals [06/30/24 0208]   BP Pulse Resp Temp SpO2   122/78 71 18 97.8 °F (36.6 °C) 97 %      MAP       --         Physical Exam    Nursing note and vitals reviewed.      Gen: AxOx3, well nourished, appears stated age, no pallor, no jaundice, appears well hydrated  Eye: EOMI, no scleral icterus, no periorbital edema or ecchymosis  Head: Normocephalic, atraumatic, no lesions, scalp appears normal  ENT: Neck supple, no stridor, no masses, no drooling or voice changes  CVS: All distal pulses intact with normal rate and rhythm, no JVD, normal S1/S2, no murmur  Pulm: Normal breath sounds, no wheezes, rales or rhonchi, no increased work of breathing  Abd:  Nondistended, soft, nontender, no organomegaly, no CVAT  Ext: RLE:  - abrasions to dorsal aspect of foot  without puncture wounds, no scars present  - No swelling  - No ecchymosis, erythema, or signs of cellulitis  - No tenderness to palpation of proximal, middle, or distal aspects of tibia or fibula  - tenderness to palpation of dorsal aspect foot  - 2+ DP  - Brisk capillary refill   - Full painless ROM of knee and ankle  - Compartments soft  - Sensation intact over entire extremity  - Motor intact EHL/FHL/TA/Gastroc  Neuro: GCS15, moving all extremities, gait intact, face grossly symmetric  Psych: normal affect, cooperative, well groomed, makes good eye contact      ED Course   Procedures  Labs Reviewed - No data to display       Imaging Results              X-Ray Foot Complete Right (Final result)  Result time 06/30/24 07:48:08      Final result by Da Otto MD (06/30/24 07:48:08)                   Impression:      Second and 3rd metatarsal shaft fractures.    Soft tissue swelling.      Electronically signed by: Da Otto  Date:    06/30/2024  Time:    07:48               Narrative:    EXAMINATION:  XR FOOT COMPLETE 3 VIEW RIGHT    CLINICAL HISTORY:  . Unspecified injury of unspecified foot, initial encounter    TECHNIQUE:  AP, lateral, and oblique views of the right foot were performed.    COMPARISON:  None    FINDINGS:  Minimally comminuted fractures of the mid to distal shafts of the 2nd and 3rd metatarsals.    Surrounding soft tissue swelling.  No dislocation.                                       Medications   naproxen tablet 500 mg (500 mg Oral Given 6/30/24 0406)     Medical Decision Making  Initial assessment  34-year-old male presenting with right foot pain following dropping a weight on his right foot. Patient is able to vocalise, breathing spontaneously, hemodynamically stable, oriented, moving all 4 limbs spontaneously.  Examination consistent with tenderness to palpation of right aspect of dorsal foot with neurovascularly intact foot.      Differential diagnosis  Fractures of foot, soft  tissue injury including ligamentous injury      ED management  Patient was well-appearing but high suspicion for fracture foot and decided to obtain foot x-ray. X-ray concerning for 2nd and 3rd metatarsal fracture with 1 mm displacement.  Patient given referral for Orthopedics, air walker cast, crutches and prescription for naproxen.  Patient signed out to oncoming team pending radiology read of foot x-ray.      Amount and/or Complexity of Data Reviewed  Radiology: ordered. Decision-making details documented in ED Course.    Risk  Prescription drug management.               ED Course as of 06/30/24 0757   Sun Jun 30, 2024   0756 X-Ray Foot Complete Right  As per my independent interpretation signs fracture of metatarsal 2 and 3 with 1 mm displacement [PM]      ED Course User Index  [PM] Kaiser Plummer MD                           Clinical Impression:  Final diagnoses:  [S99.929A] Foot injury  [S92.301A] Closed displaced fracture of metatarsal bone of right foot, unspecified metatarsal, initial encounter (Primary)          ED Disposition Condition    Discharge Stable          ED Prescriptions       Medication Sig Dispense Start Date End Date Auth. Provider    naproxen (NAPROSYN) 500 MG tablet Take 1 tablet (500 mg total) by mouth 2 (two) times daily with meals. for 10 days 20 tablet 6/30/2024 7/10/2024 Kaiser Plummer MD          Follow-up Information       Follow up With Specialties Details Why Contact Info Additional Information    Harley Romero MD Family Medicine Schedule an appointment as soon as possible for a visit   [address]  [facility] [address]  [phone]       [facility] - Emergency Dept Emergency Medicine  As needed, If symptoms worsen [address] [facility] [address] [phone]     [facility] - Orthopedics [facility] Go to   [address] [facility]  [facility] [address] [phone] [facility] - Main Building, [facility]  Floor Please park in [facility] and take Atrium elevator             Kaiser Plummer MD  Resident  06/30/24 0765       Kaiser Plummer MD  Resident  06/30/24 9734

## 2024-06-30 NOTE — ED NOTES
Nurses Note -- 4 Eyes      6/30/2024   5:53 AM      Skin assessed during: Admit      [] No Altered Skin Integrity Present    []Prevention Measures Documented      [x] Yes- Altered Skin Integrity Present or Discovered   [x] LDA Added if Not in Epic (Describe Wound)   [x] New Altered Skin Integrity was Present on Admit and Documented in LDA   [x] Wound Image Taken    Wound Care Consulted? No    Attending Nurse:  Luz Marina Javier RN/Staff Member:   polo

## 2024-06-30 NOTE — DISCHARGE INSTRUCTIONS
Thank you for coming to our Emergency Department today! It is important to remember that some problems or medical conditions are difficult to diagnose and may not be found or addressed during your Emergency Department visit.     Be sure to follow up with your primary care doctor and review all labs/imaging/tests that were performed during your ER visit with them. Some labs/imaging/tests may be outside of the normal range, and require non-emergent follow-up and/or further investigation/treatment/procedures/testing to help diagnose/exclude/prevent complications or other potentially serious medical conditions that were not discussed or addressed during your ER visit.    Please take all medications as directed. All medications may potentially have side-effects and it is impossible to predict which medications may give you side-effects or what side-effects (if any) they will give you.. If you feel that you are having a negative effect or side-effect of any medication you should immediately stop taking them and seek medical attention. If you feel that you are having a life-threatening reaction call 911.    Return to the ER with any questions/concerns, new/concerning symptoms, worsening or failure to improve.     Do not drive, swim, climb to height, take a bath, operate heavy machinery, drink alcohol or take potentially sedating medications, sign any legal documents or make any important decisions for 24 hours if you have received any pain medications, sedatives or mood altering drugs during your ER visit or within 24 hours of taking them if they have been prescribed to you.     If you do not have a primary care doctor, you may contact the one listed on your discharge paperwork or you may also call the [facility] Appointment Desk at [phone] to schedule an appointment and establish care with one. It is important to your health that you have a primary care doctor.    You can find additional resources for Dentists,  hearing aids, durable medical equipment, low cost pharmacies and other resources at [email or web address]    BELOW THIS LINE ONLY APPLIES IF YOU HAVE A COVID TEST PENDING OR IF YOU HAVE BEEN DIAGNOSED WITH COVID:  Please access [facility] to review the results of your test. Until the results of your COVID test return, you should isolate yourself so as not to potentially spread illness to others.   If your COVID test returns positive, you should isolate yourself so as not to spread illness to others. After five full days, if you are feeling better and you have not had fever for 24 hours, you can return to your typical daily activities, but you must wear a mask around others for an additional 5 days.   If your COVID test returns negative and you are either unvaccinated or more than six months out from your two-dose vaccine and are not yet boosted, you should still quarantine for 5 full days followed by strict mask use for an additional 5 full days.   If your COVID test returns negative and you have received your 2-dose initial vaccine as well as a booster, you should continue strict mask use for 10 full days after the exposure.  For all those exposed, best practice includes a test at day 5 after the exposure. This can be a home test or a test through one of the many testing centers throughout our community.   Masking is always advised to limit the spread of COVID. Cdc.gov is an excellent site to obtain the latest up to date recommendations regarding COVID and COVID testing.     CDC Testing and Quarantine Guidelines for patients with exposure to a known-positive COVID-19 person:  A close exposure is defined as anyone who has had an exposure (masked or unmasked) to a known COVID -19 positive person within 6 feet of someone for a cumulative total of 15 minutes or more over a 24-hour period.   Vaccinated and/or if you recently had a positive covid test within 90 days do NOT need to quarantine after contact with someone  who had COVID-19 unless you develop symptoms.   Fully vaccinated people who have not had a positive test within 90 days, should get tested 3-5 days after their exposure, even if they don't have symptoms and wear a mask indoors in public for 14 days following exposure or until their test result is negative.      Unvaccinated and/or NOT had a positive test within 90 days and meet close exposure  You are required by CDC guidelines to quarantine for at least 5 days from time of exposure followed by 5 days of strict masking. It is recommended, but not required to test after 5 days, unless you develop symptoms, in which case you should test at that time.  If you get tested after 5 days and your test is positive, your 5 day period of isolation starts the day of the positive test.    If your exposure does not meet the above definition, you can return to your normal daily activities to include social distancing, wearing a mask and frequent handwashing.      Here is a link to guidance from the CDC:  https://www.cdc.gov/media/releases/2021/s1227-isolation-quarantine-guidance.html      [facility] Testing Sites:  https://ldh.la.gov/page/3934      [facility] website with testing locations and guidance:  [email or web address]

## 2024-07-01 ENCOUNTER — OFFICE VISIT (OUTPATIENT)
Dept: ORTHOPEDICS | Facility: CLINIC | Age: 34
End: 2024-07-01
Payer: COMMERCIAL

## 2024-07-01 VITALS — WEIGHT: 125 LBS | BODY MASS INDEX: 20.83 KG/M2 | HEIGHT: 65 IN

## 2024-07-01 DIAGNOSIS — S92.331A CLOSED DISPLACED FRACTURE OF THIRD METATARSAL BONE OF RIGHT FOOT, INITIAL ENCOUNTER: Primary | ICD-10-CM

## 2024-07-01 DIAGNOSIS — S92.321A CLOSED DISPLACED FRACTURE OF SECOND METATARSAL BONE OF RIGHT FOOT, INITIAL ENCOUNTER: ICD-10-CM

## 2024-07-01 PROCEDURE — 3008F BODY MASS INDEX DOCD: CPT | Mod: CPTII,S$GLB,, | Performed by: NURSE PRACTITIONER

## 2024-07-01 PROCEDURE — 99999 PR PBB SHADOW E&M-EST. PATIENT-LVL III: CPT | Mod: PBBFAC,,, | Performed by: NURSE PRACTITIONER

## 2024-07-01 PROCEDURE — 1159F MED LIST DOCD IN RCRD: CPT | Mod: CPTII,S$GLB,, | Performed by: NURSE PRACTITIONER

## 2024-07-01 PROCEDURE — 1160F RVW MEDS BY RX/DR IN RCRD: CPT | Mod: CPTII,S$GLB,, | Performed by: NURSE PRACTITIONER

## 2024-07-01 PROCEDURE — 99203 OFFICE O/P NEW LOW 30 MIN: CPT | Mod: S$GLB,,, | Performed by: NURSE PRACTITIONER

## 2024-07-01 NOTE — PROGRESS NOTES
SUBJECTIVE:     Chief Complaint & History of Present Illness:  Kei Sharma is a New 34 y.o. year old male patient here for intermittent right foot/ankle pain which has been present for 2 days.  There is a history of injury.  He dropped the weight bar onto of his foot on Saturday.  He went to urgent care the following day due to pain and swelling of his foot.  X-rays were taken and the patient was found to have a fracture of his 2nd and 3rd metatarsal shaft.  Patient was placed in a tall cam boot and referred to Orthopedics.    Currently, the patient reports his pain is minimal.  States he was given a prescription for naproxen sodium in the ER but has not gotten it filled and has not required any medication at this point.  Denies any prior injuries to his foot or toes.  He has been limiting his weight-bearing activities and applying ice to the area as instructed.  Currently describes the pain as a dull achy pain that he rates at a 1 to 2/10.  Currently denies any foot or toe numbness.    Review of patient's allergies indicates:  No Known Allergies      Current Outpatient Medications   Medication Sig Dispense Refill    diphth,pertus,acell,,tetanus (BOOSTRIX TDAP) 2.5-8-5 Lf-mcg-Lf/0.5mL Syrg injection Inject into the muscle. 0.5 mL 0    lisdexamfetamine (VYVANSE) 20 MG capsule Take 1 capsule (20 mg total) by mouth every morning. 30 capsule 0    lisdexamfetamine (VYVANSE) 30 MG capsule Take 1 capsule (30 mg total) by mouth every morning. 30 capsule 0    lisdexamfetamine (VYVANSE) 30 MG capsule Take 1 capsule (30 mg total) by mouth every morning. 30 capsule 0    [START ON 7/3/2024] lisdexamfetamine (VYVANSE) 30 MG capsule Take 1 capsule (30 mg total) by mouth every morning. 30 capsule 0    naproxen (NAPROSYN) 500 MG tablet Take 1 tablet (500 mg total) by mouth 2 (two) times daily with meals. for 10 days 20 tablet 0     No current facility-administered medications for this visit.       History reviewed. No  "pertinent past medical history.    History reviewed. No pertinent surgical history.    Family History   Problem Relation Name Age of Onset    No Known Problems Mother      No Known Problems Father           Review of Systems:  ROS:  Constitutional: no fever or chills  Eyes: no visual changes  ENT: no nasal congestion or sore throat  Respiratory: no cough or shortness of breath  Cardiovascular: no chest pain or palpitations  Gastrointestinal: no nausea or vomiting, tolerating diet  Genitourinary: no hematuria or dysuria  Integument/Breast: no rash or pruritis  Hematologic/Lymphatic: no easy bruising or lymphadenopathy  Musculoskeletal: no arthralgias or myalgias  Neurological: no seizures or tremors  Behavioral/Psych: no auditory or visual hallucinations  Endocrine: no heat or cold intolerance      OBJECTIVE:     PHYSICAL EXAM:  Vital Signs (Most Recent)  There were no vitals filed for this visit.  Height: 5' 5" (165.1 cm) Weight: 56.7 kg (125 lb),   Estimated body mass index is 20.8 kg/m² as calculated from the following:    Height as of this encounter: 5' 5" (1.651 m).    Weight as of this encounter: 56.7 kg (125 lb).   General Appearance: Well nourished, well developed, in no acute distress.  HENT: Normal cephalic, oropharynx pink and moist  Eyes: PERRLA bilaterally and EOM x 4  Respiratory: Even and unlabored  Skin: Warm and Dry.   Psychiatric: AAO x 4, Mood & affect are normal.    right  Foot/Ankle    General appearance: no acute distress, alert/oriented x3, appropriate mood and affect, looks stated age, slender, and well nourished  The examination was performed out of splint/cast  Skin: normal  Swelling: mild  Warmth: no warmth  Tenderness: diffuse  ROM: normal  Strength: limited by pain  Gait: not tested  Stability: stable to testing and Cotton test: negative  Crepitus: no  Neurological Exam: normal  Vascular Exam: normal and pulse present    soft tissue swelling noted over the dorsum of the " foot.      RADIOGRAPHS:  Right foot x-rays dated June 30, 2024 shows a 2nd and 3rd metatarsal shaft fracture.  No other fractures noted.  All radiographs were personally reviewed by me.    ASSESSMENT/PLAN:       ICD-10-CM ICD-9-CM   1. Closed displaced fracture of third metatarsal bone of right foot, initial encounter  S92.331A 825.25   2. Closed displaced fracture of second metatarsal bone of right foot, initial encounter  S92.321A 825.25       Plan:  -Kei Sharma presents to clinic today with c/c right 2nd and 3rd metatarsal fracture secondary to dropping a weight bar on top of his right foot 2 days ago.  Date of injury June 29, 2024.  -X-ray as above.  -Recommend RICE therapy.    I will transition the patient over to a postop shoe.  Recommend that he limit his weight-bearing activities for another week and then he should be allowed to start placing weight down.  Recommend low-impact activity for the next 6-8 weeks.    Patient to continue take in the naproxen sodium p.r.n..  He may incorporates Tylenol over-the-counter p.r.n..  Patient advised if he develops any paresthesia he will notify me immediately.    I will see the patient back in 2 weeks at which time we will repeat x-ray of his right foot standing.

## 2024-07-16 ENCOUNTER — OFFICE VISIT (OUTPATIENT)
Dept: ORTHOPEDICS | Facility: CLINIC | Age: 34
End: 2024-07-16
Payer: COMMERCIAL

## 2024-07-16 ENCOUNTER — HOSPITAL ENCOUNTER (OUTPATIENT)
Dept: RADIOLOGY | Facility: HOSPITAL | Age: 34
Discharge: HOME | End: 2024-07-16
Attending: NURSE PRACTITIONER
Payer: COMMERCIAL

## 2024-07-16 DIAGNOSIS — S92.331D CLOSED DISPLACED FRACTURE OF THIRD METATARSAL BONE OF RIGHT FOOT WITH ROUTINE HEALING, SUBSEQUENT ENCOUNTER: ICD-10-CM

## 2024-07-16 DIAGNOSIS — S92.321D CLOSED DISPLACED FRACTURE OF SECOND METATARSAL BONE OF RIGHT FOOT WITH ROUTINE HEALING, SUBSEQUENT ENCOUNTER: Primary | ICD-10-CM

## 2024-07-16 DIAGNOSIS — S92.321D CLOSED DISPLACED FRACTURE OF SECOND METATARSAL BONE OF RIGHT FOOT WITH ROUTINE HEALING, SUBSEQUENT ENCOUNTER: ICD-10-CM

## 2024-07-16 PROCEDURE — 1159F MED LIST DOCD IN RCRD: CPT | Mod: CPTII,S$GLB,, | Performed by: NURSE PRACTITIONER

## 2024-07-16 PROCEDURE — 73630 X-RAY EXAM OF FOOT: CPT | Mod: 26,RT,, | Performed by: RADIOLOGY

## 2024-07-16 PROCEDURE — 99999 PR PBB SHADOW E&M-EST. PATIENT-LVL III: CPT | Mod: PBBFAC,,, | Performed by: NURSE PRACTITIONER

## 2024-07-16 PROCEDURE — 99213 OFFICE O/P EST LOW 20 MIN: CPT | Mod: S$GLB,,, | Performed by: NURSE PRACTITIONER

## 2024-07-16 PROCEDURE — 73630 X-RAY EXAM OF FOOT: CPT | Mod: TC,RT

## 2024-07-16 PROCEDURE — 1160F RVW MEDS BY RX/DR IN RCRD: CPT | Mod: CPTII,S$GLB,, | Performed by: NURSE PRACTITIONER

## 2024-07-16 NOTE — PROGRESS NOTES
SUBJECTIVE:     Chief Complaint & History of Present Illness:  Kei Sharma is a Established 34 y.o. year old male patient here for follow up for his right 2nd and 3rd MT fracture.  He was seen 2 weeks ago at which time recommended he limit his weight bearing and follow up for repeat imaging.    Currently, he reports he still has some pain when he puts his foot down but feels like it is improving.  He reports pain does not require any medication to control his pain.  He reports he has noticed some bruising but feels like this is getting better.  He also reports his swelling has improved. Denies any numbness or tingling sensation.  Denies falls or injuries since last seen in the clinic.    Review of patient's allergies indicates:  No Known Allergies      Current Outpatient Medications   Medication Sig Dispense Refill    diphth,pertus,acell,,tetanus (BOOSTRIX TDAP) 2.5-8-5 Lf-mcg-Lf/0.5mL Syrg injection Inject into the muscle. 0.5 mL 0    lisdexamfetamine (VYVANSE) 20 MG capsule Take 1 capsule (20 mg total) by mouth every morning. 30 capsule 0    lisdexamfetamine (VYVANSE) 30 MG capsule Take 1 capsule (30 mg total) by mouth every morning. 30 capsule 0    lisdexamfetamine (VYVANSE) 30 MG capsule Take 1 capsule (30 mg total) by mouth every morning. 30 capsule 0    lisdexamfetamine (VYVANSE) 30 MG capsule Take 1 capsule (30 mg total) by mouth every morning. 30 capsule 0     No current facility-administered medications for this visit.       No past medical history on file.    No past surgical history on file.    Family History   Problem Relation Name Age of Onset    No Known Problems Mother      No Known Problems Father           Review of Systems:  ROS:  Constitutional: no fever or chills  Eyes: no visual changes  ENT: no nasal congestion or sore throat  Respiratory: no cough or shortness of breath  Cardiovascular: no chest pain or palpitations  Gastrointestinal: no nausea or vomiting, tolerating  "diet  Genitourinary: no hematuria or dysuria  Integument/Breast: no rash or pruritis  Hematologic/Lymphatic: no easy bruising or lymphadenopathy  Musculoskeletal: no arthralgias or myalgias  Neurological: no seizures or tremors  Behavioral/Psych: no auditory or visual hallucinations  Endocrine: no heat or cold intolerance      OBJECTIVE:     PHYSICAL EXAM:  Vital Signs (Most Recent)  There were no vitals filed for this visit.     ,   Estimated body mass index is 20.8 kg/m² as calculated from the following:    Height as of 7/1/24: 5' 5" (1.651 m).    Weight as of 7/1/24: 56.7 kg (125 lb).   General Appearance: Well nourished, well developed, in no acute distress.  HENT: Normal cephalic, oropharynx pink and moist  Eyes: PERRLA bilaterally and EOM x 4  Respiratory: Even and unlabored  Skin: Warm and Dry.   Psychiatric: AAO x 4, Mood & affect are normal.    right  Foot/Ankle    General appearance: no acute distress, alert/oriented x3, appropriate mood and affect, looks stated age, slender, and well nourished  The examination was performed out of splint/cast  Skin: normal  Swelling: mild -improved  Warmth: no warmth  Tenderness: diffuse  ROM: normal  Strength: 4/5  Gait: antalgic  Stability: stable to testing and Cotton test: negative  Crepitus: no  Neurological Exam: normal  Vascular Exam: normal and pulse present    soft tissue swelling noted over the dorsum of the foot which has improved.  There is bruising to instep and toes 2-4.      RADIOGRAPHS:  Right foot x-rays obtained.  His 2nd and 3rd metatarsal shaft fracture remain stable.  No new fractures noted.  All radiographs were personally reviewed by me.    ASSESSMENT/PLAN:       ICD-10-CM ICD-9-CM   1. Closed displaced fracture of second metatarsal bone of right foot with routine healing, subsequent encounter  S92.321D V54.19   2. Closed displaced fracture of third metatarsal bone of right foot with routine healing, subsequent encounter  S92.331D V54.19 "     Plan:  -Kei Sharma presents to clinic today with c/c right 2nd and 3rd metatarsal fracture secondary to dropping a weight bar on top of his right foot 2 weeks ago.  Date of injury June 29, 2024.  -X-ray as above.  -Recommend RICE therapy.    I will allow him to advance his weight bearing to weight bear as tolerated in post op shoe.  Recommend low-impact activity for the 6-8 weeks from injury.  I will refer him to PT with [facility].    Patient pay use OTC analgesics PRN.    I will see the patient back in 4 weeks at which time we will repeat x-ray of his right foot standing.

## 2024-07-22 ENCOUNTER — CLINICAL SUPPORT (OUTPATIENT)
Dept: REHABILITATION | Facility: HOSPITAL | Age: 34
End: 2024-07-22
Payer: COMMERCIAL

## 2024-07-22 DIAGNOSIS — S92.331D CLOSED DISPLACED FRACTURE OF THIRD METATARSAL BONE OF RIGHT FOOT WITH ROUTINE HEALING, SUBSEQUENT ENCOUNTER: ICD-10-CM

## 2024-07-22 DIAGNOSIS — S92.321D CLOSED DISPLACED FRACTURE OF SECOND METATARSAL BONE OF RIGHT FOOT WITH ROUTINE HEALING, SUBSEQUENT ENCOUNTER: ICD-10-CM

## 2024-07-22 PROCEDURE — 97110 THERAPEUTIC EXERCISES: CPT

## 2024-07-22 PROCEDURE — 97161 PT EVAL LOW COMPLEX 20 MIN: CPT

## 2024-07-22 NOTE — PLAN OF CARE
[facility]   Physical Therapy Initial Evaluation      Name: Kei Sharma  Clinic Number: [id number]    Therapy Diagnosis:   Encounter Diagnoses   Name Primary?    Closed displaced fracture of second metatarsal bone of right foot with routine healing, subsequent encounter     Closed displaced fracture of third metatarsal bone of right foot with routine healing, subsequent encounter         Physician: Darin Mtz, JESÚS    Physician Orders: PT Eval and Treat   Medical Diagnosis from Referral:   S92.321D (ICD-10-CM) - Closed displaced fracture of second metatarsal bone of right foot with routine healing, subsequent encounter   S92.331D (ICD-10-CM) - Closed displaced fracture of third metatarsal bone of right foot with routine healing, subsequent encounter     Weight bear as tolerated and ROMAT     Evaluation Date: 7/22/2024  Authorization Period Expiration: 12/31/24  Plan of Care Expiration: 10/22/24  Progress Note Due: 8/22/24  Visit # / Visits authorized: 1/ 20  FOTO: 1/ 3    Precautions: Standard     Time In: 130pm  Time Out: 2:10pm  Total Appointment Time (timed & untimed codes): 40 minutes    Subjective     Date of onset: 22 days ago     History of current condition - Kei reports: he dropped an olympic barbell on his R foot 22 days ago, fractured his 2nd and 3rd metatarsals. He can put weight on it without a crutch, but is using one today as a precaution. He has pain with continued walking and after icing it. The swelling is going down recently. He is a runner and staves off low back pain with running. Not being able to stand up normally now is making his low back pain irritated.     Falls: none    Imaging: Xray: FINDINGS:  Healing fracture deformities mid shafts 2nd and 3rd metatarsals, position alignment stable, no dislocation, decreased posttraumatic subcutaneous edema.    Prior Therapy: PT for bursitis and low back   Social History:  lives with their spouse in 2 story  home (does not use the stairs much)  Occupation: works from home ()  Prior Level of Function: (I)  Current Level of Function: Mod I with surgical shoe & single crutch     Pain:  Current 1/10, worst 4/10, best 0/10   Location: right foot    Description: Aching  Aggravating Factors: weight bearing, standing in shower without boot   Easing Factors: ice    Patients goals: be able to walk around the track again      Medical History:   No past medical history on file.    Surgical History:   Kei Sharma  has no past surgical history on file.    Medications:   Kei has a current medication list which includes the following prescription(s): boostrix tdap, lisdexamfetamine, lisdexamfetamine, lisdexamfetamine, and lisdexamfetamine.    Allergies:   Review of patient's allergies indicates:  No Known Allergies     Objective      NEUROLOGICAL SCREENING     Sensory deficit: WFL BLE     Palpation: TTP dorsal R foot       Range of Motion/Strength:         Hip Right MMT Left MMT Pain/Dysfunction with Movement (pain=!)   AROM        flexion  WFL 4/5  WFL 4/5            abduction  WFL 4/5  WFL 4/5    Internal rotation  WFL 4+/5  WFL 4+/5    External rotation  WFL 4/5  WFL 4/5      Knee Right MMT Left MMT Pain/Dysfunction with Movement (pain=!)   AROM        flexion  WFL 4+/5  WFL 4+/5    extension  WFL 4+/5  WFL 4+/5      Ankle Right MMT Left MMT Pain/Dysfunction with Movement (pain=!)   AROM        dorsiflexion  5 deg AROM 3/5  10 deg AROM 4+/5    plantarflexion  30 deg AROM 3/5  40 deg AROM 4+/5    inversion  23 deg AROM 3+/5  35 deg AROM 4+/5    eversion  15 deg AROM 3/5  20 deg AROM 4+/5      Gait Analysis:With AD.  Device Used -  Axillary crutches  Deviations: antalgic, step-to pattern     Intake Outcome Measure for FOTO Foot Survey    Therapist reviewed FOTO scores for Kei Sharma on 7/22/2024.   FOTO documents entered into [employer] - see Media section or FOTO account episode details.      Intake Score: 55%         Treatment     Total Treatment time (time-based codes) separate from Evaluation: 10 minutes     Kei received the treatments listed below:      therapeutic exercises to develop strength, endurance, ROM, flexibility, and posture for    minutes including:  HEP instruction & return demonstration:   [x] sidelying hip abd  [x] seated heelslides  [x] seated ankle IV/EV with towel  [x] ankle circles  [x] ankle ABCs  []    Patient Education and Home Exercises     Education provided:   - HEP  -self retrograde edema massage    Written Home Exercises Provided: yes. Exercises were reviewed and Kei was able to demonstrate them prior to the end of the session.  Kei demonstrated good  understanding of the education provided. See EMR under Patient Instructions for exercises provided during therapy sessions.    Assessment     Kei is a 34 y.o. male referred to outpatient Physical Therapy with a medical diagnosis of closed displaced fracture of 2nd and third metatarsals. Patient presents with impaired gait and mobility associated with R foot/ankle weakness, pain, and impaired range of motion following fractures.     Patient prognosis is Good.   Patient will benefit from skilled outpatient Physical Therapy to address the deficits stated above and in the chart below, provide patient /family education, and to maximize patientt's level of independence.     Plan of care discussed with patient: Yes  Patient's spiritual, cultural and educational needs considered and patient is agreeable to the plan of care and goals as stated below:     Anticipated Barriers for therapy: none  Medical Necessity is demonstrated by the following  History  Co-morbidities and personal factors that may impact the plan of care [x] LOW: no personal factors / co-morbidities  [] MODERATE: 1-2 personal factors / co-morbidities  [] HIGH: 3+ personal factors / co-morbidities    Moderate / High Support Documentation:   Co-morbidities  affecting plan of care: n/a    Personal Factors:   no deficits     Examination  Body Structures and Functions, activity limitations and participation restrictions that may impact the plan of care [] LOW: addressing 1-2 elements  [x] MODERATE: 3+ elements  [] HIGH: 4+ elements (please support below)    Moderate / High Support Documentation: impacts ability to ambulate in home & community, drive, perform household chores & ADLs     Clinical Presentation [] LOW: stable  [x] MODERATE: Evolving  [] HIGH: Unstable     Decision Making/ Complexity Score: low       Goals:  Short Term Goals: 6 visits  1. Pt will be (I) /c HEP to supplement PT in order to improve functional tasks  2. Pt will wean safely from surgical boot to shoe for community ambulation   3. Pt will improve R ankle DF AROM to >/= 15 deg for range of motion required for community ambulation    Long Term Goals: 12 visits   1. Pt will improve R ankle MMTs to 4-/5 grossly to improve strength for functional activities  2. Pt will improve FOTO score to </= 70% to reduce perceived pain with mobility   3. Pt will be able to walk 1/4 mile pain free in order to return to recreational fitness   Plan     Plan of care Certification: 7/22/2024 to 10/22/24.    Outpatient Physical Therapy 1- 2 times weekly for 12 visits to include the following interventions: Gait Training, Manual Therapy, Moist Heat/ Ice, Neuromuscular Re-ed, Patient Education, Therapeutic Activities, and Therapeutic Exercise, E-stim and Dry Needling as needed.     Maria Teresa Cosme, PT

## 2024-08-01 ENCOUNTER — CLINICAL SUPPORT (OUTPATIENT)
Dept: REHABILITATION | Facility: HOSPITAL | Age: 34
End: 2024-08-01
Payer: COMMERCIAL

## 2024-08-01 DIAGNOSIS — R26.9 GAIT ABNORMALITY: Primary | ICD-10-CM

## 2024-08-01 PROCEDURE — 97530 THERAPEUTIC ACTIVITIES: CPT

## 2024-08-01 PROCEDURE — 97110 THERAPEUTIC EXERCISES: CPT

## 2024-08-01 PROCEDURE — 97112 NEUROMUSCULAR REEDUCATION: CPT

## 2024-08-01 NOTE — PROGRESS NOTES
"  [facility]   Physical Therapy Treatment Note        Name: Kei Sharma  Clinic Number: [id number]    Therapy Diagnosis:   Encounter Diagnosis   Name Primary?    Gait abnormality Yes     Physician: Darin Mtz NP    Visit Date: 8/1/2024    Physician Orders: PT Eval and Treat   Medical Diagnosis from Referral:   S92.321D (ICD-10-CM) - Closed displaced fracture of second metatarsal bone of right foot with routine healing, subsequent encounter   S92.331D (ICD-10-CM) - Closed displaced fracture of third metatarsal bone of right foot with routine healing, subsequent encounter      Weight bear as tolerated and ROMAT     PTA Visit #: 0/5   Date of last FOTO:7/22/24    Time In: 225pm  Time Out: 315pm  Total Billable Time: 50 minutes    SUBJECTIVE     Pt reports: his foot felt ok with his HEP. He is only using the crutch for safety at this point   He was compliant with home exercise program.  Response to previous treatment: last session was initial evaluation   Functional change: none    Pain: 1/10  Location: right foot      OBJECTIVE     Objective Measures updated at progress report unless specified.     Treatment     Kei received the treatments listed below:  (new treatments are indicated in bold)    therapeutic exercises to develop strength, endurance, ROM, and flexibility for 15 minutes including:   [] sidelying hip abd  [x] R seated heelslides x15  [] ankle circles  [] ankle ABCs  [x]R ankle scouring on step x5 each direction   [x] gastroc wedge stretch right lower extremity 3x30"       neuromuscular re-education activities to improve: Balance, Coordination, Kinesthetic, Sense, and Proprioception for 12 minutes. The following activities were included:  [x] BAPS board Lvl 1 x15 CW/CCW  [x] fitterboard rocking DF/PF 2' Lvl 1  [x]fitterboard balance lvl 1    therapeutic activities to improve functional performance for 23  minutes, including:    [x] shuttle DLP 3.5c 2x10   [x] " seated ankle IV/EV with towel 2'   [x] R ankle Green TB: DF, PF, IV, EV x20 ea  []    Patient Education and Home Exercises     Home Exercises Provided and Patient Education Provided     Education provided:   - HEP    Written Home Exercises Provided: yes. Exercises were reviewed and Kei was able to demonstrate them prior to the end of the session.  Kei demonstrated good  understanding of the education provided. See EMR under Patient Instructions for exercises provided during therapy sessions    ASSESSMENT     Pt tolerated session well. Progressed ankle AROM in closed chain and strengthening in open chain today. Added balance challenges on fitterboard without increased pain. Progress as tolerated. Plan to continue per MD protocol: weight bearing as tolerated activities, no high impact activities or exercises.     Kei Is progressing well towards his goals.   Pt prognosis is Good.     Pt will continue to benefit from skilled outpatient physical therapy to address the deficits listed in the problem list box on initial evaluation, provide pt/family education and to maximize pt's level of independence in the home and community environment.     Pt's spiritual, cultural and educational needs considered and pt agreeable to plan of care and goals.     Anticipated barriers to physical therapy: none    Goals: Short Term Goals: 6 visits  1. Pt will be (I) /c HEP to supplement PT in order to improve functional tasks  2. Pt will wean safely from surgical boot to shoe for community ambulation   3. Pt will improve R ankle DF AROM to >/= 15 deg for range of motion required for community ambulation     Long Term Goals: 12 visits   1. Pt will improve R ankle MMTs to 4-/5 grossly to improve strength for functional activities  2. Pt will improve FOTO score to </= 70% to reduce perceived pain with mobility   3. Pt will be able to walk 1/4 mile pain free in order to return to recreational fitness     PLAN     Plan of care Certification:  7/22/2024 to 10/22/24.     Outpatient Physical Therapy 1- 2 times weekly for 12 visits to include the following interventions: Gait Training, Manual Therapy, Moist Heat/ Ice, Neuromuscular Re-ed, Patient Education, Therapeutic Activities, and Therapeutic Exercise, E-stim and Dry Needling as needed.        Maria Teresa Cosme, PT

## 2024-08-05 ENCOUNTER — HOSPITAL ENCOUNTER (OUTPATIENT)
Dept: RADIOLOGY | Facility: HOSPITAL | Age: 34
Discharge: HOME | End: 2024-08-05
Attending: NURSE PRACTITIONER
Payer: COMMERCIAL

## 2024-08-05 ENCOUNTER — OFFICE VISIT (OUTPATIENT)
Dept: ORTHOPEDICS | Facility: CLINIC | Age: 34
End: 2024-08-05
Payer: COMMERCIAL

## 2024-08-05 DIAGNOSIS — S92.321D CLOSED DISPLACED FRACTURE OF SECOND METATARSAL BONE OF RIGHT FOOT WITH ROUTINE HEALING, SUBSEQUENT ENCOUNTER: Primary | ICD-10-CM

## 2024-08-05 DIAGNOSIS — S92.321A CLOSED DISPLACED FRACTURE OF SECOND METATARSAL BONE OF RIGHT FOOT, INITIAL ENCOUNTER: ICD-10-CM

## 2024-08-05 DIAGNOSIS — S92.331D CLOSED DISPLACED FRACTURE OF THIRD METATARSAL BONE OF RIGHT FOOT WITH ROUTINE HEALING, SUBSEQUENT ENCOUNTER: Primary | ICD-10-CM

## 2024-08-05 DIAGNOSIS — S92.321D CLOSED DISPLACED FRACTURE OF SECOND METATARSAL BONE OF RIGHT FOOT WITH ROUTINE HEALING, SUBSEQUENT ENCOUNTER: ICD-10-CM

## 2024-08-05 PROCEDURE — 73630 X-RAY EXAM OF FOOT: CPT | Mod: TC,RT

## 2024-08-05 PROCEDURE — 1160F RVW MEDS BY RX/DR IN RCRD: CPT | Mod: CPTII,S$GLB,, | Performed by: NURSE PRACTITIONER

## 2024-08-05 PROCEDURE — 73630 X-RAY EXAM OF FOOT: CPT | Mod: 26,RT,, | Performed by: RADIOLOGY

## 2024-08-05 PROCEDURE — 1159F MED LIST DOCD IN RCRD: CPT | Mod: CPTII,S$GLB,, | Performed by: NURSE PRACTITIONER

## 2024-08-05 PROCEDURE — 99213 OFFICE O/P EST LOW 20 MIN: CPT | Mod: S$GLB,,, | Performed by: NURSE PRACTITIONER

## 2024-08-05 PROCEDURE — 99999 PR PBB SHADOW E&M-EST. PATIENT-LVL II: CPT | Mod: PBBFAC,,, | Performed by: NURSE PRACTITIONER

## 2024-08-07 ENCOUNTER — CLINICAL SUPPORT (OUTPATIENT)
Dept: REHABILITATION | Facility: HOSPITAL | Age: 34
End: 2024-08-07
Payer: COMMERCIAL

## 2024-08-07 DIAGNOSIS — S92.331D CLOSED DISPLACED FRACTURE OF THIRD METATARSAL BONE OF RIGHT FOOT WITH ROUTINE HEALING, SUBSEQUENT ENCOUNTER: ICD-10-CM

## 2024-08-07 DIAGNOSIS — S92.321D CLOSED DISPLACED FRACTURE OF SECOND METATARSAL BONE OF RIGHT FOOT WITH ROUTINE HEALING, SUBSEQUENT ENCOUNTER: ICD-10-CM

## 2024-08-07 DIAGNOSIS — S92.331D CLOSED DISPLACED FRACTURE OF THIRD METATARSAL BONE OF RIGHT FOOT WITH ROUTINE HEALING: ICD-10-CM

## 2024-08-07 DIAGNOSIS — R26.9 GAIT ABNORMALITY: Primary | ICD-10-CM

## 2024-08-07 DIAGNOSIS — S92.321D CLOSED DISPLACED FRACTURE OF SECOND METATARSAL BONE OF RIGHT FOOT WITH ROUTINE HEALING: ICD-10-CM

## 2024-08-07 PROCEDURE — 97112 NEUROMUSCULAR REEDUCATION: CPT | Mod: CQ

## 2024-08-07 PROCEDURE — 97110 THERAPEUTIC EXERCISES: CPT | Mod: CQ

## 2024-08-07 PROCEDURE — 97530 THERAPEUTIC ACTIVITIES: CPT | Mod: CQ

## 2024-08-08 RX ORDER — LISDEXAMFETAMINE DIMESYLATE 30 MG/1
30 CAPSULE ORAL EVERY MORNING
Qty: 30 CAPSULE | Refills: 0 | Status: SHIPPED | OUTPATIENT
Start: 2024-08-08

## 2024-08-14 ENCOUNTER — CLINICAL SUPPORT (OUTPATIENT)
Dept: REHABILITATION | Facility: HOSPITAL | Age: 34
End: 2024-08-14
Payer: COMMERCIAL

## 2024-08-14 DIAGNOSIS — S92.321D CLOSED DISPLACED FRACTURE OF SECOND METATARSAL BONE OF RIGHT FOOT WITH ROUTINE HEALING: ICD-10-CM

## 2024-08-14 DIAGNOSIS — R26.9 GAIT ABNORMALITY: Primary | ICD-10-CM

## 2024-08-14 DIAGNOSIS — S92.331D CLOSED DISPLACED FRACTURE OF THIRD METATARSAL BONE OF RIGHT FOOT WITH ROUTINE HEALING: ICD-10-CM

## 2024-08-14 PROCEDURE — 97110 THERAPEUTIC EXERCISES: CPT | Mod: CQ

## 2024-08-14 PROCEDURE — 97112 NEUROMUSCULAR REEDUCATION: CPT | Mod: CQ

## 2024-08-14 PROCEDURE — 97530 THERAPEUTIC ACTIVITIES: CPT | Mod: CQ

## 2024-08-14 NOTE — PROGRESS NOTES
"  [facility]   Physical Therapy Treatment Note        Name: Kei Sharma  Clinic Number: [id number]    Therapy Diagnosis:   Encounter Diagnoses   Name Primary?    Gait abnormality Yes    Closed displaced fracture of second metatarsal bone of right foot with routine healing     Closed displaced fracture of third metatarsal bone of right foot with routine healing          Physician: Darin Mtz NP    Visit Date: 8/14/2024    Physician Orders: PT Eval and Treat   Medical Diagnosis from Referral:   S92.321D (ICD-10-CM) - Closed displaced fracture of second metatarsal bone of right foot with routine healing, subsequent encounter   S92.331D (ICD-10-CM) - Closed displaced fracture of third metatarsal bone of right foot with routine healing, subsequent encounter      Weight bear as tolerated and ROMAT     PTA Visit #: 2/5   Date of last FOTO:7/22/24    Time In: 5:26 pm  Time Out: 6:06 pm  Total Billable Time: 40 minutes    SUBJECTIVE     Pt reports: a little soreness along MT's, however no pain. Pt states that next Monday he will begin to wean to a tennis shoe.   He was compliant with home exercise program.  Response to previous treatment: no adverse effects  Functional change: none    Pain: 1/10  Location: right foot      OBJECTIVE     Objective Measures updated at progress report unless specified.     Treatment     Kei received the treatments listed below:  (new treatments are indicated in bold)    therapeutic exercises to develop strength, endurance, ROM, and flexibility for 8 minutes including:  [] sidelying hip abd  [x] R seated heelslides 5" x15  [] ankle circles  [] ankle ABCs  [x]R ankle scouring on step x10 each direction   [x] gastroc wedge stretch right lower extremity 3x30"       neuromuscular re-education activities to improve: Balance, Coordination, Kinesthetic, Sense, and Proprioception for 23 minutes. The following activities were included:  [x] BAPS board Lvl 2 " x15 CW/CCW  [x] Nu-step 5 minutes supervised (for improved weight acceptance and proprioception)   [x] fitterboard rocking DF/PF 2x10 Lvl 3  [x]fitterboard balance lvl 2, 2 min   [x] airex clocks 10x ea   []  []      therapeutic activities to improve functional performance for 9 minutes, including:    [x] shuttle DLP 3.5c 2x10   [x] single leg (RLE) shuttle 2 cords 2x10   [x] seated ankle IV/EV with towel 2'   [x] R ankle Green TB: DF, PF, IV, EV x20 ea  [x] wall squats with ball 2x10    Patient Education and Home Exercises     Home Exercises Provided and Patient Education Provided     Education provided:   - HEP    Written Home Exercises Provided: yes. Exercises were reviewed and Kei was able to demonstrate them prior to the end of the session.  Kei demonstrated good  understanding of the education provided. See EMR under Patient Instructions for exercises provided during therapy sessions    ASSESSMENT     Progressed functional strengthening and NMR activities. Pt exhibited instability in bilateral ankles with airex clocks, however no LOB noted. Verbal cues required to maintain proper form during wall squats, as well as to decrease upper leg compensation for ankle inversion and eversion. Pt to begin weaning to regular tennis shoe beginning Monday, therefore discussed proper weaning.       Kei Is progressing well towards his goals.   Pt prognosis is Good.     Pt will continue to benefit from skilled outpatient physical therapy to address the deficits listed in the problem list box on initial evaluation, provide pt/family education and to maximize pt's level of independence in the home and community environment.     Pt's spiritual, cultural and educational needs considered and pt agreeable to plan of care and goals.     Anticipated barriers to physical therapy: none    Goals: Short Term Goals: 6 visits  1. Pt will be (I) /c HEP to supplement PT in order to improve functional tasks  2. Pt will wean safely from  surgical boot to shoe for community ambulation   3. Pt will improve R ankle DF AROM to >/= 15 deg for range of motion required for community ambulation     Long Term Goals: 12 visits   1. Pt will improve R ankle MMTs to 4-/5 grossly to improve strength for functional activities  2. Pt will improve FOTO score to </= 70% to reduce perceived pain with mobility   3. Pt will be able to walk 1/4 mile pain free in order to return to recreational fitness     PLAN     Plan of care Certification: 7/22/2024 to 10/22/24.     Outpatient Physical Therapy 1- 2 times weekly for 12 visits to include the following interventions: Gait Training, Manual Therapy, Moist Heat/ Ice, Neuromuscular Re-ed, Patient Education, Therapeutic Activities, and Therapeutic Exercise, E-stim and Dry Needling as needed.        Norah Florentino, PTA

## 2024-08-19 ENCOUNTER — PATIENT MESSAGE (OUTPATIENT)
Dept: ORTHOPEDICS | Facility: CLINIC | Age: 34
End: 2024-08-19
Payer: COMMERCIAL

## 2024-08-19 ENCOUNTER — CLINICAL SUPPORT (OUTPATIENT)
Dept: REHABILITATION | Facility: HOSPITAL | Age: 34
End: 2024-08-19
Payer: COMMERCIAL

## 2024-08-19 DIAGNOSIS — S92.321D CLOSED DISPLACED FRACTURE OF SECOND METATARSAL BONE OF RIGHT FOOT WITH ROUTINE HEALING: ICD-10-CM

## 2024-08-19 DIAGNOSIS — R26.9 GAIT ABNORMALITY: Primary | ICD-10-CM

## 2024-08-19 DIAGNOSIS — S92.331D CLOSED DISPLACED FRACTURE OF THIRD METATARSAL BONE OF RIGHT FOOT WITH ROUTINE HEALING: ICD-10-CM

## 2024-08-19 PROCEDURE — 97112 NEUROMUSCULAR REEDUCATION: CPT

## 2024-08-19 PROCEDURE — 97530 THERAPEUTIC ACTIVITIES: CPT

## 2024-08-19 NOTE — PROGRESS NOTES
[facility]   Physical Therapy Treatment Note        Name: Kei Sharma  Clinic Number: [id number]    Therapy Diagnosis:   Encounter Diagnoses   Name Primary?    Gait abnormality Yes    Closed displaced fracture of second metatarsal bone of right foot with routine healing     Closed displaced fracture of third metatarsal bone of right foot with routine healing        Physician: Darin Mtz NP    Visit Date: 8/19/2024    Physician Orders: PT Eval and Treat   Medical Diagnosis from Referral:   S92.321D (ICD-10-CM) - Closed displaced fracture of second metatarsal bone of right foot with routine healing, subsequent encounter   S92.331D (ICD-10-CM) - Closed displaced fracture of third metatarsal bone of right foot with routine healing, subsequent encounter      Weight bear as tolerated and ROMAT     PTA Visit #: 2/5   Date of last FOTO:7/22/24        Time In: 230pm  Time Out: 315 pm  Total Billable Time: 45 minutes    SUBJECTIVE     Pt reports: today is his first day in a tennis shoe. His foot is a little sore, but otherwise ok. He would like to return to the gym and call to schedule PT if he's having difficulty with his foot   He was compliant with home exercise program.  Response to previous treatment: no adverse effects  Functional change: none    Pain: 1/10  Location: right foot      OBJECTIVE     Range of Motion/Strength:        Ankle Right MMT Left MMT Pain/Dysfunction with Movement (pain=!)   AROM             dorsiflexion  5 deg AROM 4-/5  10 deg AROM 4+/5     plantarflexion  48 deg AROM 4-/5  40 deg AROM 4+/5     inversion  23 deg AROM 4-/5  35 deg AROM 4+/5     eversion  30 deg AROM 4-/5  20 deg AROM 4+/5        Gait Analysis:With AD.  Device Used -  none; steady with shoe, non-antalgic     Treatment     Kei received the treatments listed below:  (new treatments are indicated in bold)    therapeutic exercises to develop strength, endurance, ROM, and flexibility for  "8 minutes including:  [x] objective measurements   [] sidelying hip abd  [] R seated heelslides 5" x15  [] ankle circles  [] ankle ABCs  []R ankle scouring on step x10 each direction   [] gastroc wedge stretch right lower extremity 3x30"   [x] upright bike 6' Lvl 6 resistance- for BLE endurance       neuromuscular re-education activities to improve: Balance, Coordination, Kinesthetic, Sense, and Proprioception for 14  minutes. The following activities were included:  [] BAPS board Lvl 2 x15 CW/CCW  [x] Nu-step 5 minutes supervised (for improved weight acceptance and proprioception)   [x] fitterboard rocking right lower extremity DF/PF 2x10 Lvl 1  [x]fitterboard balance lvl 3, 2 min   [x] monster walks green TB x5   [x] airex clocks 10x ea   []  []      therapeutic activities to improve functional performance for 23 minutes, including:    [x] shuttle DLP 3.5c 3x10   [x] single leg (RLE) shuttle 2.5 cords 3x10    [x] 2 up, 1 down 3c right lower extremity 2x10   [] seated ankle IV/EV with towel 2'   [] R ankle Green TB: DF, PF, IV, EV x20 ea  [x] wall squats with ball 2x10  [x] single leg TRX squats to plyobox x20 B     Patient Education and Home Exercises     Home Exercises Provided and Patient Education Provided     Education provided:   - HEP    Written Home Exercises Provided: yes. Exercises were reviewed and Kei was able to demonstrate them prior to the end of the session.  Kei demonstrated good  understanding of the education provided. See EMR under Patient Instructions for exercises provided during therapy sessions    ASSESSMENT     Progressed functional strengthening and NMR activities.Pt demonstrates improved R ankle range of motion and strength. He is weaning into a shoe without difficulty and plans to follow up with PT if he is having any difficulty.     Kei Is progressing well towards his goals.   Pt prognosis is Good.     Pt will continue to benefit from skilled outpatient physical therapy to address " the deficits listed in the problem list box on initial evaluation, provide pt/family education and to maximize pt's level of independence in the home and community environment.     Pt's spiritual, cultural and educational needs considered and pt agreeable to plan of care and goals.     Anticipated barriers to physical therapy: none    Goals: Short Term Goals: 6 visits  1. Pt will be (I) /c HEP to supplement PT in order to improve functional tasks  MET  2. Pt will wean safely from surgical boot to shoe for community ambulation  MET  3. Pt will improve R ankle DF AROM to >/= 15 deg for range of motion required for community ambulation (progressing)      Long Term Goals: 12 visits    1. Pt will improve R ankle MMTs to 4-/5 grossly to improve strength for functional activities MET  2. Pt will improve FOTO score to </= 70% to reduce perceived pain with mobility (progressing)   3. Pt will be able to walk 1/4 mile pain free in order to return to recreational fitness  MET    PLAN     Plan of care Certification: 7/22/2024 to 10/22/24.     Outpatient Physical Therapy 1- 2 times weekly for 12 visits to include the following interventions: Gait Training, Manual Therapy, Moist Heat/ Ice, Neuromuscular Re-ed, Patient Education, Therapeutic Activities, and Therapeutic Exercise, E-stim and Dry Needling as needed.        Maria Teresa Cosme, PT

## 2024-09-10 ENCOUNTER — OFFICE VISIT (OUTPATIENT)
Dept: PSYCHIATRY | Facility: CLINIC | Age: 34
End: 2024-09-10
Payer: COMMERCIAL

## 2024-09-10 DIAGNOSIS — F90.0 ATTENTION DEFICIT HYPERACTIVITY DISORDER (ADHD), PREDOMINANTLY INATTENTIVE TYPE: Primary | ICD-10-CM

## 2024-09-10 PROCEDURE — 99999 PR PBB SHADOW E&M-EST. PATIENT-LVL II: CPT | Mod: PBBFAC,,, | Performed by: NURSE PRACTITIONER

## 2024-09-10 PROCEDURE — 99213 OFFICE O/P EST LOW 20 MIN: CPT | Mod: S$GLB,,, | Performed by: NURSE PRACTITIONER

## 2024-09-10 RX ORDER — LISDEXAMFETAMINE DIMESYLATE 30 MG/1
30 CAPSULE ORAL EVERY MORNING
Qty: 30 CAPSULE | Refills: 0 | Status: SHIPPED | OUTPATIENT
Start: 2024-10-10

## 2024-09-10 RX ORDER — LISDEXAMFETAMINE DIMESYLATE 30 MG/1
30 CAPSULE ORAL EVERY MORNING
Qty: 30 CAPSULE | Refills: 0 | Status: SHIPPED | OUTPATIENT
Start: 2024-09-11

## 2024-09-10 RX ORDER — LISDEXAMFETAMINE DIMESYLATE 30 MG/1
30 CAPSULE ORAL EVERY MORNING
Qty: 30 CAPSULE | Refills: 0 | Status: SHIPPED | OUTPATIENT
Start: 2024-11-08

## 2024-09-10 NOTE — PROGRESS NOTES
"  Outpatient Psychiatry Follow-Up Visit (MD/NP)    9/10/2024    Clinical Status of Patient:  Outpatient (Ambulatory)    Chief Complaint:  Kei Sharma is a 34 y.o. male who presents today for follow-up of attention problems.  Met with patient.  Pt new to me.     Last visit was: with psych resident. Chart and  reviewed.     Interval History and Content of Current Session:  Current Psychiatric Medications/changes  vyvanse 30mg kindra     Established therapeutic rapport and transition of care from previous provider. Pt presents bright affect and euthymic mood. Pt states, "I've been doing well" .Reports effective response to ADHD medications and denies side effects. Denies any unmanaged mood, anxiety, or insomnia symptoms.  Denies SI/HI/AVH. Will continue current medications    Psychotherapy:  Target symptoms: distractability, lack of focus  Why chosen therapy is appropriate versus another modality: relevant to diagnosis  Outcome monitoring methods: self-report  Therapeutic intervention type: insight oriented psychotherapy  Topics discussed/themes: building skills sets for symptom management, symptom recognition  The patient's response to the intervention is accepting. The patient's progress toward treatment goals is good.   Duration of intervention: 11 minutes.    Review of Systems   PSYCHIATRIC: Pertinant items are noted in the narrative.  CONSTITUTIONAL: No weight gain or loss.   MUSCULOSKELETAL: No pain or stiffness of the joints.  NEUROLOGIC: No weakness, sensory changes, seizures, confusion, memory loss, tremor or other abnormal movements.  ENDOCRINE: No polydipsia or polyuria.  INTEGUMENTARY: No rashes or lacerations.  EYES: No exophthalmos, jaundice or blindness.  ENT: No dizziness, tinnitus or hearing loss.  RESPIRATORY: No shortness of breath.  CARDIOVASCULAR: No tachycardia or chest pain.  GASTROINTESTINAL: No nausea, vomiting, pain, constipation or diarrhea.  GENITOURINARY: No frequency, dysuria " or sexual dysfunction.  HEMATOLOGIC/LYMPHATIC: No excessive bleeding, prolonged or excessive bleeding after dental extraction/injury.  ALLERGIC/IMMUNOLOGIC: No allergic response to materials, foods or animals at this time.    Past Medical, Family and Social History: The patient's past medical, family and social history have been reviewed and updated as appropriate within the electronic medical record - see encounter notes.    Compliance: yes    Side effects: None    Risk Parameters:  Patient reports no suicidal ideation  Patient reports no homicidal ideation  Patient reports no self-injurious behavior  Patient reports no violent behavior    Exam (detailed: at least 9 elements; comprehensive: all 15 elements)   Constitutional  Vitals:  Most recent vital signs, dated greater than 90 days prior to this appointment, were reviewed.   There were no vitals filed for this visit.     General:  unremarkable, age appropriate     Musculoskeletal  Muscle Strength/Tone:  not examined   Gait & Station:  non-ataxic     Psychiatric  Speech:  no latency; no press   Mood & Affect:  steady  congruent and appropriate   Thought Process:  normal and logical   Associations:  intact   Thought Content:  normal, no suicidality, no homicidality, delusions, or paranoia   Insight:  intact   Judgement: behavior is adequate to circumstances   Orientation:  grossly intact   Memory: intact for content of interview   Language: grossly intact   Attention Span & Concentration:  able to focus   Fund of Knowledge:  intact and appropriate to age and level of education     Assessment and Diagnosis   Status/Progress: Based on the examination today, the patient's problem(s) is/are adequately but not ideally controlled.  New problems have not been presented today.   Co-morbidities and Lack of compliance are not complicating management of the primary condition.  There are no active rule-out diagnoses for this patient at this time.     General Impression:        ICD-10-CM ICD-9-CM   1. Attention deficit hyperactivity disorder (ADHD), predominantly inattentive type  F90.0 314.00       Intervention/Counseling/Treatment Plan   Medication Management: The risks and benefits of medication were discussed with the patient.  Continue Vyvanse 30 mg daily  Fill Dates: 9/11, 10/10, 11/08)  Follow-up with me every 3 months by virtual visit for refills.    Return to Clinic: 3 months    Risks, benefits, side effects and alternative treatments discussed with patient. Patient agrees with the current plan as documented.  Encouraged Patient to keep future appointments.  Take medications as prescribed and abstain from substance abuse.  Pt to present to ED for thoughts to harm himself or others

## 2024-09-10 NOTE — PATIENT INSTRUCTIONS
Continue Vyvanse 30 mg daily  Fill Dates: 9/11, 10/10, 11/08)  Follow-up with me every 3 months by virtual visit for refills.

## 2024-09-16 ENCOUNTER — OFFICE VISIT (OUTPATIENT)
Dept: ORTHOPEDICS | Facility: CLINIC | Age: 34
End: 2024-09-16
Payer: COMMERCIAL

## 2024-09-16 ENCOUNTER — HOSPITAL ENCOUNTER (OUTPATIENT)
Dept: RADIOLOGY | Facility: HOSPITAL | Age: 34
Discharge: HOME | End: 2024-09-16
Attending: NURSE PRACTITIONER
Payer: COMMERCIAL

## 2024-09-16 VITALS — BODY MASS INDEX: 20.83 KG/M2 | HEIGHT: 65 IN | WEIGHT: 125 LBS

## 2024-09-16 DIAGNOSIS — M79.671 RIGHT FOOT PAIN: ICD-10-CM

## 2024-09-16 DIAGNOSIS — S92.321D CLOSED DISPLACED FRACTURE OF SECOND METATARSAL BONE OF RIGHT FOOT WITH ROUTINE HEALING, SUBSEQUENT ENCOUNTER: Primary | ICD-10-CM

## 2024-09-16 DIAGNOSIS — M79.671 RIGHT FOOT PAIN: Primary | ICD-10-CM

## 2024-09-16 DIAGNOSIS — S92.331D CLOSED DISPLACED FRACTURE OF THIRD METATARSAL BONE OF RIGHT FOOT WITH ROUTINE HEALING, SUBSEQUENT ENCOUNTER: ICD-10-CM

## 2024-09-16 PROCEDURE — 99999 PR PBB SHADOW E&M-EST. PATIENT-LVL III: CPT | Mod: PBBFAC,,, | Performed by: NURSE PRACTITIONER

## 2024-09-16 PROCEDURE — 99213 OFFICE O/P EST LOW 20 MIN: CPT | Mod: S$GLB,,, | Performed by: NURSE PRACTITIONER

## 2024-09-16 PROCEDURE — 73630 X-RAY EXAM OF FOOT: CPT | Mod: TC,RT

## 2024-09-16 PROCEDURE — 1159F MED LIST DOCD IN RCRD: CPT | Mod: CPTII,S$GLB,, | Performed by: NURSE PRACTITIONER

## 2024-09-16 PROCEDURE — 73630 X-RAY EXAM OF FOOT: CPT | Mod: 26,RT,, | Performed by: RADIOLOGY

## 2024-09-16 PROCEDURE — 3008F BODY MASS INDEX DOCD: CPT | Mod: CPTII,S$GLB,, | Performed by: NURSE PRACTITIONER

## 2024-09-16 PROCEDURE — 1160F RVW MEDS BY RX/DR IN RCRD: CPT | Mod: CPTII,S$GLB,, | Performed by: NURSE PRACTITIONER

## 2024-09-16 NOTE — PROGRESS NOTES
SUBJECTIVE:     Chief Complaint & History of Present Illness:  Kei Sharma is a Established 34 y.o. year old male patient here for follow up for his right 2nd and 3rd MT fracture.  He was seen 6 weeks ago at which time he was told to continue physical therapy and avoid high impact activities.    Currently, he reports he has been able to resume most of his activities except running.  He has been able to go back swimming.  He does endorse some intermittent pain at the distal 2nd metatarsal near the toe.  He states the pain resolves with ice and rest.  Denies any foot or toe numbness.  Denies any falls or injuries since last seen in clinic.  He is back in regular shoes without need of any assistive devices.    Review of patient's allergies indicates:  No Known Allergies      Current Outpatient Medications   Medication Sig Dispense Refill    diphth,pertus,acell,,tetanus (BOOSTRIX TDAP) 2.5-8-5 Lf-mcg-Lf/0.5mL Syrg injection Inject into the muscle. 0.5 mL 0    [START ON 11/8/2024] lisdexamfetamine (VYVANSE) 30 MG capsule Take 1 capsule (30 mg total) by mouth every morning. 30 capsule 0    [START ON 10/10/2024] lisdexamfetamine (VYVANSE) 30 MG capsule Take 1 capsule (30 mg total) by mouth every morning. 30 capsule 0    lisdexamfetamine (VYVANSE) 30 MG capsule Take 1 capsule (30 mg total) by mouth every morning. 30 capsule 0     No current facility-administered medications for this visit.       No past medical history on file.    No past surgical history on file.    Family History   Problem Relation Name Age of Onset    No Known Problems Mother      No Known Problems Father           Review of Systems:  ROS:  Constitutional: no fever or chills  Eyes: no visual changes  ENT: no nasal congestion or sore throat  Respiratory: no cough or shortness of breath  Cardiovascular: no chest pain or palpitations  Gastrointestinal: no nausea or vomiting, tolerating diet  Genitourinary: no hematuria or  "dysuria  Integument/Breast: no rash or pruritis  Hematologic/Lymphatic: no easy bruising or lymphadenopathy  Musculoskeletal: no arthralgias or myalgias  Neurological: no seizures or tremors  Behavioral/Psych: no auditory or visual hallucinations  Endocrine: no heat or cold intolerance      OBJECTIVE:     PHYSICAL EXAM:  Vital Signs (Most Recent)  There were no vitals filed for this visit.  Height: 5' 5" (165.1 cm) Weight: 56.7 kg (125 lb),   Estimated body mass index is 20.8 kg/m² as calculated from the following:    Height as of this encounter: 5' 5" (1.651 m).    Weight as of this encounter: 56.7 kg (125 lb).   General Appearance: Well nourished, well developed, in no acute distress.  HENT: Normal cephalic, oropharynx pink and moist  Eyes: PERRLA bilaterally and EOM x 4  Respiratory: Even and unlabored  Skin: Warm and Dry.   Psychiatric: AAO x 4, Mood & affect are normal.    right  Foot/Ankle    General appearance: no acute distress, alert/oriented x3, appropriate mood and affect, looks stated age, slender, and well nourished  The examination was performed out of splint/cast  Skin: normal  Swelling:  Resolve  Warmth: no warmth  Tenderness:  None  ROM: normal  Strength: 5/5  Gait:  Normal  Stability: stable to testing and Cotton test: negative  Crepitus: no  Neurological Exam: normal  Vascular Exam: normal and pulse present    normal exam, no swelling, tenderness, instability; ligaments intact, full range of motion of all ankle/foot joints      RADIOGRAPHS:  Right foot x-rays obtained.  His 2nd and 3rd metatarsal shaft fracture shows progressive callus formation.  No new fractures noted.  All radiographs were personally reviewed by me.    ASSESSMENT/PLAN:       ICD-10-CM ICD-9-CM   1. Closed displaced fracture of second metatarsal bone of right foot with routine healing, subsequent encounter  S92.321D V54.19   2. Closed displaced fracture of third metatarsal bone of right foot with routine healing, subsequent " encounter  S92.331D V54.19     Plan:  -Kei Sharma presents to clinic today with c/c right 2nd and 3rd metatarsal fracture secondary to dropping a weight bar on top of his right foot 2 weeks ago.  Date of injury June 29, 2024.  -X-ray as above.    I discussed the x-ray findings with the patient.  I advised he can slowly reintroduce his ADLs, let pain be his guide.    I will discharge the patient.  Advised him to follow up in the clinic p.r.n..

## 2024-12-03 NOTE — PROGRESS NOTES
Subjective:   Patient ID:  Kei Sharma is a 34 y.o. male who presents for evaluation of Tachycardia      HPI:  He presents today for evaluation of an episode of tachycardia.  He has ADHD and has been on a stable dose of Vyvanse for the past 2 years.  He is an avid runner.  He runs daily and recently completed a marathon.  About 3 weeks ago he noticed at the completion of a run his heart rate had increased to 194 beats per minute.  His heart rate stayed elevated between the 120s and 130s for about 10-12 hours afterwards.  He was asymptomatic.  He fell asleep to take a nap and when he awoke his heart rate was back to normal.  This was an isolated episode.  His average heart rate is typically in the 80s.  He denies feeling any palpitations at other times and has not had any syncopal events.  He wears a [employer] watch but unfortunately does not have a rhythm strip from the time of his tachycardia.    History reviewed. No pertinent past medical history.    History reviewed. No pertinent surgical history.    Social History     Socioeconomic History    Marital status: Single   Tobacco Use    Smoking status: Never    Smokeless tobacco: Never   Substance and Sexual Activity    Alcohol use: Yes     Comment: 1-2 drinks per day    Drug use: Never    Sexual activity: Yes     Social Drivers of Health     Financial Resource Strain: Low Risk  (12/20/2023)    Overall Financial Resource Strain (CARDIA)     Difficulty of Paying Living Expenses: Not very hard   Food Insecurity: No Food Insecurity (12/20/2023)    Hunger Vital Sign     Worried About Running Out of Food in the Last Year: Never true     Ran Out of Food in the Last Year: Never true   Transportation Needs: No Transportation Needs (12/20/2023)    PRAPARE - Transportation     Lack of Transportation (Medical): No     Lack of Transportation (Non-Medical): No   Physical Activity: Sufficiently Active (12/20/2023)    Exercise Vital Sign     Days of Exercise per Week: 6  days     Minutes of Exercise per Session: 40 min   Stress: Stress Concern Present (12/20/2023)    [employer] - Occupational Stress Questionnaire     Feeling of Stress : To some extent   Housing Stability: Low Risk  (12/20/2023)    Housing Stability Vital Sign     Unable to Pay for Housing in the Last Year: No     Number of Places Lived in the Last Year: 1     Unstable Housing in the Last Year: No       Family History   Problem Relation Name Age of Onset    Hypertension Mother      Heart disease Father age 63     Deep vein thrombosis Father age 63     Pacemaker/defibrilator Father age 63     Heart disease Maternal Grandfather      Heart disease Paternal Grandfather         Patient's Medications   New Prescriptions    No medications on file   Previous Medications    DIPHTH,PERTUS,ACELL,,TETANUS (BOOSTRIX TDAP) 2.5-8-5 LF-MCG-LF/0.5ML SYRG INJECTION    Inject into the muscle.    LISDEXAMFETAMINE (VYVANSE) 30 MG CAPSULE    Take 1 capsule (30 mg total) by mouth every morning.    LISDEXAMFETAMINE (VYVANSE) 30 MG CAPSULE    Take 1 capsule (30 mg total) by mouth every morning.    LISDEXAMFETAMINE (VYVANSE) 30 MG CAPSULE    Take 1 capsule (30 mg total) by mouth every morning.   Modified Medications    No medications on file   Discontinued Medications    No medications on file       Review of Systems   Constitutional: Negative for malaise/fatigue and weight gain.   HENT:  Negative for hearing loss.    Eyes:  Negative for visual disturbance.   Cardiovascular:  Negative for chest pain, claudication, dyspnea on exertion, leg swelling, near-syncope, orthopnea, palpitations, paroxysmal nocturnal dyspnea and syncope.   Respiratory:  Negative for cough, shortness of breath, sleep disturbances due to breathing, snoring and wheezing.    Endocrine: Negative for cold intolerance, heat intolerance, polydipsia, polyphagia and polyuria.   Hematologic/Lymphatic: Negative for bleeding problem. Does not  "bruise/bleed easily.   Skin:  Negative for rash and suspicious lesions.   Musculoskeletal:  Negative for arthritis, falls, joint pain, muscle weakness and myalgias.   Gastrointestinal:  Negative for abdominal pain, change in bowel habit, constipation, diarrhea, heartburn, hematochezia, melena and nausea.   Genitourinary:  Negative for hematuria and nocturia.   Neurological:  Negative for excessive daytime sleepiness, dizziness, headaches, light-headedness, loss of balance and weakness.   Psychiatric/Behavioral:  Negative for depression. The patient is nervous/anxious.    Allergic/Immunologic: Negative for environmental allergies.       BP (!) 150/92 (BP Location: Right arm, Patient Position: Sitting)   Pulse 82   Ht 5' 5" (1.651 m)   Wt 57.2 kg (126 lb 1.7 oz)   SpO2 99%   BMI 20.98 kg/m²     Objective:   Physical Exam  Constitutional:       Appearance: He is well-developed.      Comments:      HENT:      Head: Normocephalic and atraumatic.   Eyes:      General: No scleral icterus.     Conjunctiva/sclera: Conjunctivae normal.      Pupils: Pupils are equal, round, and reactive to light.   Neck:      Thyroid: No thyromegaly.      Vascular: No hepatojugular reflux or JVD.      Trachea: No tracheal deviation.   Cardiovascular:      Rate and Rhythm: Normal rate and regular rhythm.      Chest Wall: PMI is not displaced.      Pulses: Intact distal pulses.           Carotid pulses are 2+ on the right side and 2+ on the left side.       Radial pulses are 2+ on the right side and 2+ on the left side.        Dorsalis pedis pulses are 2+ on the right side and 2+ on the left side.        Posterior tibial pulses are 2+ on the right side and 2+ on the left side.      Heart sounds: Normal heart sounds.   Pulmonary:      Effort: Pulmonary effort is normal.      Breath sounds: Normal breath sounds.   Abdominal:      General: Bowel sounds are normal. There is no distension.      Palpations: Abdomen is soft. There is no mass.      " Tenderness: There is no abdominal tenderness.   Musculoskeletal:         General: No tenderness.      Cervical back: Normal range of motion and neck supple.   Lymphadenopathy:      Cervical: No cervical adenopathy.   Skin:     General: Skin is warm and dry.      Findings: No erythema or rash.      Nails: There is no clubbing.   Neurological:      Mental Status: He is alert and oriented to person, place, and time.   Psychiatric:         Speech: Speech normal.         Behavior: Behavior normal.         Lab Results   Component Value Date     08/12/2022    K 4.0 08/12/2022     08/12/2022    CO2 22 (L) 08/12/2022    BUN 13 08/12/2022    CREATININE 1.0 08/12/2022    GLU 73 08/12/2022    HGBA1C 4.5 08/12/2022    MG 1.7 02/25/2022    AST 37 08/12/2022    ALT 26 08/12/2022    ALBUMIN 4.0 08/12/2022    PROT 7.1 08/12/2022    BILITOT 1.0 08/12/2022    WBC 6.30 08/26/2022    HGB 14.6 08/26/2022    HCT 43.3 08/26/2022    MCV 94 08/26/2022     08/26/2022    TSH 1.275 08/12/2022    CHOL 146 08/12/2022    HDL 66 08/12/2022    LDLCALC 58.8 (L) 08/12/2022    TRIG 106 08/12/2022       Assessment:     1. Palpitations :  It sounds like he may have had an arrhythmia.  His physical exam is normal.  I have ordered an ECG some lab work.  We discussed that if his symptoms recur I can order a Holter monitor, however in the meantime we discussed the option of using a [employer] mobile mariama or his [employer] watch if it is able to record a rhythm strip should his symptoms recur.   2. Elevated blood pressure reading :  His blood pressure reading today was elevated.  I gave him a copy of the dash diet and asked him to keep a blood pressure log for me for a few days and send it to me through the portal.  We did discuss that his Vyvanse increase his risk of both elevated blood pressure as well as a arrhythmia.       Plan:     Kei was seen today for tachycardia.    Diagnoses and all orders for this visit:    Palpitations  -     EKG  12-lead  -     TSH; Future  -     Comprehensive Metabolic Panel; Future  -     CBC Auto Differential; Future  -     Magnesium; Future    Elevated blood pressure reading  -     EKG 12-lead  -     TSH; Future  -     Comprehensive Metabolic Panel; Future  -     CBC Auto Differential; Future  -     Magnesium; Future        Thank you for allowing me to participate in this patient's care. Please do not hesitate to contact me with any questions or concerns.

## 2024-12-04 ENCOUNTER — HOSPITAL ENCOUNTER (OUTPATIENT)
Dept: CARDIOLOGY | Facility: CLINIC | Age: 34
Discharge: HOME | End: 2024-12-04
Payer: COMMERCIAL

## 2024-12-04 ENCOUNTER — OFFICE VISIT (OUTPATIENT)
Dept: CARDIOLOGY | Facility: CLINIC | Age: 34
End: 2024-12-04
Payer: COMMERCIAL

## 2024-12-04 ENCOUNTER — LAB VISIT (OUTPATIENT)
Dept: LAB | Facility: HOSPITAL | Age: 34
End: 2024-12-04
Attending: INTERNAL MEDICINE
Payer: COMMERCIAL

## 2024-12-04 VITALS
BODY MASS INDEX: 21.01 KG/M2 | HEART RATE: 82 BPM | DIASTOLIC BLOOD PRESSURE: 92 MMHG | OXYGEN SATURATION: 99 % | SYSTOLIC BLOOD PRESSURE: 150 MMHG | HEIGHT: 65 IN | WEIGHT: 126.13 LBS

## 2024-12-04 DIAGNOSIS — R00.2 PALPITATIONS: Primary | ICD-10-CM

## 2024-12-04 DIAGNOSIS — R03.0 ELEVATED BLOOD PRESSURE READING: ICD-10-CM

## 2024-12-04 DIAGNOSIS — R00.2 PALPITATIONS: ICD-10-CM

## 2024-12-04 LAB
ALBUMIN SERPL BCP-MCNC: 4.2 G/DL (ref 3.5–5.2)
ALP SERPL-CCNC: 46 U/L (ref 40–150)
ALT SERPL W/O P-5'-P-CCNC: 37 U/L (ref 10–44)
ANION GAP SERPL CALC-SCNC: 8 MMOL/L (ref 8–16)
AST SERPL-CCNC: 50 U/L (ref 10–40)
BASOPHILS # BLD AUTO: 0.06 K/UL (ref 0–0.2)
BASOPHILS NFR BLD: 0.8 % (ref 0–1.9)
BILIRUB SERPL-MCNC: 0.9 MG/DL (ref 0.1–1)
BUN SERPL-MCNC: 15 MG/DL (ref 6–20)
CALCIUM SERPL-MCNC: 10.1 MG/DL (ref 8.7–10.5)
CHLORIDE SERPL-SCNC: 100 MMOL/L (ref 95–110)
CO2 SERPL-SCNC: 30 MMOL/L (ref 23–29)
CREAT SERPL-MCNC: 1.3 MG/DL (ref 0.5–1.4)
DIFFERENTIAL METHOD BLD: ABNORMAL
EOSINOPHIL # BLD AUTO: 0.3 K/UL (ref 0–0.5)
EOSINOPHIL NFR BLD: 4.4 % (ref 0–8)
ERYTHROCYTE [DISTWIDTH] IN BLOOD BY AUTOMATED COUNT: 12.6 % (ref 11.5–14.5)
EST. GFR  (NO RACE VARIABLE): >60 ML/MIN/1.73 M^2
GLUCOSE SERPL-MCNC: 102 MG/DL (ref 70–110)
HCT VFR BLD AUTO: 43.1 % (ref 40–54)
HGB BLD-MCNC: 14.8 G/DL (ref 14–18)
IMM GRANULOCYTES # BLD AUTO: 0.02 K/UL (ref 0–0.04)
IMM GRANULOCYTES NFR BLD AUTO: 0.3 % (ref 0–0.5)
LYMPHOCYTES # BLD AUTO: 2.5 K/UL (ref 1–4.8)
LYMPHOCYTES NFR BLD: 32.2 % (ref 18–48)
MAGNESIUM SERPL-MCNC: 1.8 MG/DL (ref 1.6–2.6)
MCH RBC QN AUTO: 32.2 PG (ref 27–31)
MCHC RBC AUTO-ENTMCNC: 34.3 G/DL (ref 32–36)
MCV RBC AUTO: 94 FL (ref 82–98)
MONOCYTES # BLD AUTO: 0.7 K/UL (ref 0.3–1)
MONOCYTES NFR BLD: 9.2 % (ref 4–15)
NEUTROPHILS # BLD AUTO: 4.1 K/UL (ref 1.8–7.7)
NEUTROPHILS NFR BLD: 53.1 % (ref 38–73)
NRBC BLD-RTO: 0 /100 WBC
OHS QRS DURATION: 92 MS
OHS QTC CALCULATION: 450 MS
PLATELET # BLD AUTO: 293 K/UL (ref 150–450)
PMV BLD AUTO: 10.2 FL (ref 9.2–12.9)
POTASSIUM SERPL-SCNC: 3.7 MMOL/L (ref 3.5–5.1)
PROT SERPL-MCNC: 7.6 G/DL (ref 6–8.4)
RBC # BLD AUTO: 4.59 M/UL (ref 4.6–6.2)
SODIUM SERPL-SCNC: 138 MMOL/L (ref 136–145)
TSH SERPL DL<=0.005 MIU/L-ACNC: 1.79 UIU/ML (ref 0.4–4)
WBC # BLD AUTO: 7.71 K/UL (ref 3.9–12.7)

## 2024-12-04 PROCEDURE — 1159F MED LIST DOCD IN RCRD: CPT | Mod: CPTII,S$GLB,, | Performed by: INTERNAL MEDICINE

## 2024-12-04 PROCEDURE — 80053 COMPREHEN METABOLIC PANEL: CPT | Performed by: INTERNAL MEDICINE

## 2024-12-04 PROCEDURE — 3077F SYST BP >= 140 MM HG: CPT | Mod: CPTII,S$GLB,, | Performed by: INTERNAL MEDICINE

## 2024-12-04 PROCEDURE — 85025 COMPLETE CBC W/AUTO DIFF WBC: CPT | Performed by: INTERNAL MEDICINE

## 2024-12-04 PROCEDURE — 36415 COLL VENOUS BLD VENIPUNCTURE: CPT | Performed by: INTERNAL MEDICINE

## 2024-12-04 PROCEDURE — 93000 ELECTROCARDIOGRAM COMPLETE: CPT | Mod: S$GLB,,, | Performed by: INTERNAL MEDICINE

## 2024-12-04 PROCEDURE — 99999 PR PBB SHADOW E&M-EST. PATIENT-LVL IV: CPT | Mod: PBBFAC,,, | Performed by: INTERNAL MEDICINE

## 2024-12-04 PROCEDURE — 84443 ASSAY THYROID STIM HORMONE: CPT | Performed by: INTERNAL MEDICINE

## 2024-12-04 PROCEDURE — 99204 OFFICE O/P NEW MOD 45 MIN: CPT | Mod: S$GLB,,, | Performed by: INTERNAL MEDICINE

## 2024-12-04 PROCEDURE — 3080F DIAST BP >= 90 MM HG: CPT | Mod: CPTII,S$GLB,, | Performed by: INTERNAL MEDICINE

## 2024-12-04 PROCEDURE — 83735 ASSAY OF MAGNESIUM: CPT | Performed by: INTERNAL MEDICINE

## 2024-12-04 PROCEDURE — 3008F BODY MASS INDEX DOCD: CPT | Mod: CPTII,S$GLB,, | Performed by: INTERNAL MEDICINE

## 2024-12-04 PROCEDURE — 1160F RVW MEDS BY RX/DR IN RCRD: CPT | Mod: CPTII,S$GLB,, | Performed by: INTERNAL MEDICINE

## 2024-12-04 NOTE — PATIENT INSTRUCTIONS
Problem: Safety - Adult  Goal: Free from fall injury  Outcome: Adequate for Discharge  Flowsheets (Taken 9/22/2023 1233)  Free From Fall Injury:   Instruct family/caregiver on patient safety   Based on caregiver fall risk screen, instruct family/caregiver to ask for assistance with transferring infant if caregiver noted to have fall risk factors  Note: Free from falls while in [facility]. Problem: Discharge Planning  Goal: Discharge to home or other facility with appropriate resources  Flowsheets (Taken 9/22/2023 1233)  Discharge to home or other facility with appropriate resources:   Identify barriers to discharge with patient and caregiver   Arrange for needed discharge resources and transportation as appropriate   Identify discharge learning needs (meds, wound care, etc)  Note: Verbalize understanding of discharge instructions, follow up appointments, and when to call Physician. Problem: Chronic Conditions and Co-morbidities  Goal: Patient's chronic conditions and co-morbidity symptoms are monitored and maintained or improved  Flowsheets (Taken 9/22/2023 1233)  Care Plan - Patient's Chronic Conditions and Co-Morbidity Symptoms are Monitored and Maintained or Improved:   Monitor and assess patient's chronic conditions and comorbid symptoms for stability, deterioration, or improvement   Collaborate with multidisciplinary team to address chronic and comorbid conditions and prevent exacerbation or deterioration   Update acute care plan with appropriate goals if chronic or comorbid symptoms are exacerbated and prevent overall improvement and discharge  Note: Patient verbalizes understanding to verbal information given on Opdivo,action and possible side effects. Aware to call MD if develop complications.       Chemotherapy Teaching     What is Chemotherapy   Drug action [x]   Method of Administration [x]   Handouts given []     Side Effects  Nausea/vomiting [x]   Diarrhea [x]   Fatigue [x]   Signs / We discussed keeping a log of home blood pressures and notifying the office if it is consistently running above 130/80 mmHg. I have asked him to send me his readings via [facility] in 1-2 weeks.    Limit sodium intake to < 1500mg daily and follow the DASH diet plan.    OMRON blood pressure cuff.    [employer] mobile mariama for rhythm strip.   Symptoms of infection [x]   Neutropenia [x]   Thrombocytopenia [x]   Alopecia [x]   neuropathy [x]   [place] diet &  the importance of fluids [x]       Micellaneous  Importance of nutrition [x]   Importance of oral hygiene [x]   When to call the MD [x]   Monitoring labs [x]   Use of supportive services []     Explanation of Drug Regimen / Frequency  Opdivo     Comments  Verbalized understanding to drug,action,side effects and when to call MD         Problem: Infection - Adult  Goal: Absence of infection at discharge  Flowsheets (Taken 9/22/2023 1233)  Absence of infection at discharge:   Assess and monitor for signs and symptoms of infection   Monitor lab/diagnostic results   Monitor all insertion sites i.e., indwelling lines, tubes and drains  Note: Mediport site with no redness or warmth. Skin over port site intact with no signs of breakdown noted. Patient verbalizes signs/symptoms of port infection and when to notify the physician. Care plan reviewed with patient and spouse. Patient and spouse verbalize understanding of the plan of care and contribute to goal setting.

## 2024-12-05 ENCOUNTER — TELEPHONE (OUTPATIENT)
Dept: CARDIOLOGY | Facility: CLINIC | Age: 34
End: 2024-12-05
Payer: COMMERCIAL

## 2024-12-05 DIAGNOSIS — R74.01 TRANSAMINITIS: Primary | ICD-10-CM

## 2024-12-12 ENCOUNTER — PATIENT MESSAGE (OUTPATIENT)
Dept: CARDIOLOGY | Facility: CLINIC | Age: 34
End: 2024-12-12
Payer: COMMERCIAL

## 2024-12-13 ENCOUNTER — TELEPHONE (OUTPATIENT)
Dept: CARDIOLOGY | Facility: CLINIC | Age: 34
End: 2024-12-13
Payer: COMMERCIAL

## 2024-12-13 DIAGNOSIS — R00.2 PALPITATIONS: ICD-10-CM

## 2024-12-13 DIAGNOSIS — R03.0 ELEVATED BLOOD PRESSURE READING: Primary | ICD-10-CM

## 2024-12-13 NOTE — TELEPHONE ENCOUNTER
BP still high. Im ordering some blood work and will recommend a blood pressure medication once the results are available.

## 2024-12-13 NOTE — TELEPHONE ENCOUNTER
Patient called regarding new labs per Dr. Blanco and plans to add a BP once his labs results are back.  Pt verbalized understanding.

## 2024-12-20 ENCOUNTER — TELEPHONE (OUTPATIENT)
Dept: CARDIOLOGY | Facility: CLINIC | Age: 34
End: 2024-12-20
Payer: COMMERCIAL

## 2024-12-20 ENCOUNTER — PATIENT MESSAGE (OUTPATIENT)
Dept: CARDIOLOGY | Facility: CLINIC | Age: 34
End: 2024-12-20
Payer: COMMERCIAL

## 2024-12-20 ENCOUNTER — LAB VISIT (OUTPATIENT)
Dept: LAB | Facility: HOSPITAL | Age: 34
End: 2024-12-20
Attending: INTERNAL MEDICINE
Payer: COMMERCIAL

## 2024-12-20 DIAGNOSIS — R03.0 ELEVATED BLOOD PRESSURE READING: ICD-10-CM

## 2024-12-20 DIAGNOSIS — R00.2 PALPITATIONS: ICD-10-CM

## 2024-12-20 DIAGNOSIS — R74.01 TRANSAMINITIS: ICD-10-CM

## 2024-12-20 LAB
ALBUMIN SERPL BCP-MCNC: 4.1 G/DL (ref 3.5–5.2)
ALBUMIN SERPL BCP-MCNC: 4.1 G/DL (ref 3.5–5.2)
ALP SERPL-CCNC: 45 U/L (ref 40–150)
ALP SERPL-CCNC: 45 U/L (ref 40–150)
ALT SERPL W/O P-5'-P-CCNC: 45 U/L (ref 10–44)
ALT SERPL W/O P-5'-P-CCNC: 45 U/L (ref 10–44)
ANION GAP SERPL CALC-SCNC: 9 MMOL/L (ref 8–16)
AST SERPL-CCNC: 32 U/L (ref 10–40)
AST SERPL-CCNC: 32 U/L (ref 10–40)
BASOPHILS # BLD AUTO: 0.05 K/UL (ref 0–0.2)
BASOPHILS NFR BLD: 0.7 % (ref 0–1.9)
BILIRUB DIRECT SERPL-MCNC: 0.3 MG/DL (ref 0.1–0.3)
BILIRUB SERPL-MCNC: 1 MG/DL (ref 0.1–1)
BILIRUB SERPL-MCNC: 1 MG/DL (ref 0.1–1)
BUN SERPL-MCNC: 18 MG/DL (ref 6–20)
CALCIUM SERPL-MCNC: 9.8 MG/DL (ref 8.7–10.5)
CHLORIDE SERPL-SCNC: 97 MMOL/L (ref 95–110)
CHOLEST SERPL-MCNC: 172 MG/DL (ref 120–199)
CHOLEST/HDLC SERPL: 3.8 {RATIO} (ref 2–5)
CO2 SERPL-SCNC: 31 MMOL/L (ref 23–29)
CREAT SERPL-MCNC: 1.3 MG/DL (ref 0.5–1.4)
DIFFERENTIAL METHOD BLD: ABNORMAL
EOSINOPHIL # BLD AUTO: 0.1 K/UL (ref 0–0.5)
EOSINOPHIL NFR BLD: 1.3 % (ref 0–8)
ERYTHROCYTE [DISTWIDTH] IN BLOOD BY AUTOMATED COUNT: 11.7 % (ref 11.5–14.5)
EST. GFR  (NO RACE VARIABLE): >60 ML/MIN/1.73 M^2
GLUCOSE SERPL-MCNC: 95 MG/DL (ref 70–110)
HCT VFR BLD AUTO: 44.2 % (ref 40–54)
HDLC SERPL-MCNC: 45 MG/DL (ref 40–75)
HDLC SERPL: 26.2 % (ref 20–50)
HGB BLD-MCNC: 15.2 G/DL (ref 14–18)
IMM GRANULOCYTES # BLD AUTO: 0.01 K/UL (ref 0–0.04)
IMM GRANULOCYTES NFR BLD AUTO: 0.1 % (ref 0–0.5)
LDLC SERPL CALC-MCNC: 108.2 MG/DL (ref 63–159)
LYMPHOCYTES # BLD AUTO: 2.2 K/UL (ref 1–4.8)
LYMPHOCYTES NFR BLD: 33.1 % (ref 18–48)
MCH RBC QN AUTO: 31.1 PG (ref 27–31)
MCHC RBC AUTO-ENTMCNC: 34.4 G/DL (ref 32–36)
MCV RBC AUTO: 91 FL (ref 82–98)
MONOCYTES # BLD AUTO: 0.6 K/UL (ref 0.3–1)
MONOCYTES NFR BLD: 9.5 % (ref 4–15)
NEUTROPHILS # BLD AUTO: 3.7 K/UL (ref 1.8–7.7)
NEUTROPHILS NFR BLD: 55.3 % (ref 38–73)
NONHDLC SERPL-MCNC: 127 MG/DL
NRBC BLD-RTO: 0 /100 WBC
PLATELET # BLD AUTO: 369 K/UL (ref 150–450)
PMV BLD AUTO: 9.7 FL (ref 9.2–12.9)
POTASSIUM SERPL-SCNC: 3.4 MMOL/L (ref 3.5–5.1)
PROT SERPL-MCNC: 7.5 G/DL (ref 6–8.4)
PROT SERPL-MCNC: 7.5 G/DL (ref 6–8.4)
RBC # BLD AUTO: 4.88 M/UL (ref 4.6–6.2)
SODIUM SERPL-SCNC: 137 MMOL/L (ref 136–145)
T4 FREE SERPL-MCNC: 1 NG/DL (ref 0.71–1.51)
TRIGL SERPL-MCNC: 94 MG/DL (ref 30–150)
TSH SERPL DL<=0.005 MIU/L-ACNC: 0.97 UIU/ML (ref 0.4–4)
WBC # BLD AUTO: 6.77 K/UL (ref 3.9–12.7)

## 2024-12-20 PROCEDURE — 36415 COLL VENOUS BLD VENIPUNCTURE: CPT | Performed by: INTERNAL MEDICINE

## 2024-12-20 PROCEDURE — 84443 ASSAY THYROID STIM HORMONE: CPT | Performed by: INTERNAL MEDICINE

## 2024-12-20 PROCEDURE — 80053 COMPREHEN METABOLIC PANEL: CPT | Performed by: INTERNAL MEDICINE

## 2024-12-20 PROCEDURE — 80061 LIPID PANEL: CPT | Performed by: INTERNAL MEDICINE

## 2024-12-20 PROCEDURE — 85025 COMPLETE CBC W/AUTO DIFF WBC: CPT | Performed by: INTERNAL MEDICINE

## 2024-12-20 PROCEDURE — 84439 ASSAY OF FREE THYROXINE: CPT | Performed by: INTERNAL MEDICINE

## 2024-12-20 RX ORDER — AMLODIPINE BESYLATE 5 MG/1
5 TABLET ORAL DAILY
Qty: 90 TABLET | Refills: 3 | Status: SHIPPED | OUTPATIENT
Start: 2024-12-20 | End: 2025-12-20

## 2024-12-20 NOTE — TELEPHONE ENCOUNTER
Pt called and given results and instructed to start the Amlodipine with goal of BP <130/80.  Patient verbalized understanding

## 2024-12-26 ENCOUNTER — TELEPHONE (OUTPATIENT)
Dept: CARDIOLOGY | Facility: CLINIC | Age: 34
End: 2024-12-26
Payer: COMMERCIAL

## 2024-12-26 ENCOUNTER — PATIENT MESSAGE (OUTPATIENT)
Dept: CARDIOLOGY | Facility: CLINIC | Age: 34
End: 2024-12-26
Payer: COMMERCIAL

## 2024-12-26 DIAGNOSIS — I10 PRIMARY HYPERTENSION: ICD-10-CM

## 2024-12-26 DIAGNOSIS — I10 PRIMARY HYPERTENSION: Primary | ICD-10-CM

## 2024-12-26 RX ORDER — VALSARTAN 40 MG/1
40 TABLET ORAL DAILY
Qty: 30 TABLET | Refills: 11 | Status: SHIPPED | OUTPATIENT
Start: 2024-12-26 | End: 2025-12-26

## 2024-12-26 NOTE — TELEPHONE ENCOUNTER
Patient called.  Informed about medication changes and labs in one week.  Pt verbalized understanding.  Pt requested for labs and medication to be sent to [place].

## 2024-12-30 RX ORDER — VALSARTAN 40 MG/1
40 TABLET ORAL DAILY
Qty: 30 TABLET | Refills: 11 | Status: SHIPPED | OUTPATIENT
Start: 2024-12-30 | End: 2025-12-30

## 2025-01-03 ENCOUNTER — LAB VISIT (OUTPATIENT)
Dept: LAB | Facility: HOSPITAL | Age: 35
End: 2025-01-03
Payer: COMMERCIAL

## 2025-01-03 DIAGNOSIS — I10 PRIMARY HYPERTENSION: ICD-10-CM

## 2025-01-03 LAB
ANION GAP SERPL CALC-SCNC: 7 MMOL/L (ref 8–16)
BUN SERPL-MCNC: 15 MG/DL (ref 6–20)
CALCIUM SERPL-MCNC: 9.5 MG/DL (ref 8.7–10.5)
CHLORIDE SERPL-SCNC: 102 MMOL/L (ref 95–110)
CO2 SERPL-SCNC: 29 MMOL/L (ref 23–29)
CREAT SERPL-MCNC: 1 MG/DL (ref 0.5–1.4)
EST. GFR  (NO RACE VARIABLE): >60 ML/MIN/1.73 M^2
GLUCOSE SERPL-MCNC: 97 MG/DL (ref 70–110)
POTASSIUM SERPL-SCNC: 4 MMOL/L (ref 3.5–5.1)
SODIUM SERPL-SCNC: 138 MMOL/L (ref 136–145)

## 2025-01-03 PROCEDURE — 36415 COLL VENOUS BLD VENIPUNCTURE: CPT | Performed by: INTERNAL MEDICINE

## 2025-01-03 PROCEDURE — 80048 BASIC METABOLIC PNL TOTAL CA: CPT | Performed by: INTERNAL MEDICINE

## 2025-01-21 PROBLEM — R53.83 FATIGUE: Status: ACTIVE | Noted: 2025-01-21

## 2025-01-21 PROBLEM — I10 HTN (HYPERTENSION), BENIGN: Status: ACTIVE | Noted: 2025-01-21

## 2025-01-21 NOTE — PROGRESS NOTES
Subjective:   Patient ID:  Kei Sharma is a 34 y.o. male who presents for follow-up of Palpitations and now fatigue    HPI:  He was having palpitations possibly from ADHD meds and side effects on amlopipine and valsartan for BP. 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        Review of Systems   Constitutional: Positive for malaise/fatigue. Negative for chills, decreased appetite, diaphoresis, fever, night sweats, weight gain and weight loss.   HENT:  Negative for congestion, hoarse voice, nosebleeds, sore throat and tinnitus.    Eyes:  Negative for blurred vision, double vision, vision loss in left eye, vision loss in right eye, visual disturbance and visual halos.   Cardiovascular:  Negative for chest pain, claudication, cyanosis, dyspnea on exertion, irregular heartbeat, leg swelling, near-syncope, orthopnea, palpitations, paroxysmal nocturnal dyspnea and syncope.   Respiratory:  Negative for cough, hemoptysis, shortness of breath, sleep disturbances due to breathing, snoring, sputum production and wheezing.    Endocrine: Negative for cold intolerance, heat intolerance, polydipsia, polyphagia and polyuria.   Hematologic/Lymphatic: Negative for adenopathy and bleeding problem. Does not bruise/bleed easily.   Skin:  Negative for color change, dry skin, flushing, itching, nail changes, poor wound healing, rash, skin cancer, suspicious lesions and unusual hair distribution.   Musculoskeletal:  Negative for arthritis, back pain, falls, gout, joint pain, joint swelling, muscle cramps, muscle weakness, myalgias and stiffness.   Gastrointestinal:  Negative for abdominal pain, anorexia, change in bowel habit, constipation, diarrhea, dysphagia, heartburn, hematemesis, hematochezia, melena and vomiting.   Genitourinary:  Negative for  decreased libido, dysuria, hematuria, hesitancy and urgency.   Neurological:  Negative for excessive daytime sleepiness, dizziness, focal weakness, headaches, light-headedness, loss of balance, numbness, paresthesias, seizures, sensory change, tremors, vertigo and weakness.   Psychiatric/Behavioral:  Negative for altered mental status, depression, hallucinations, memory loss, substance abuse and suicidal ideas. The patient does not have insomnia and is not nervous/anxious.    Allergic/Immunologic: Negative for environmental allergies and hives.       Objective: There were no vitals taken for this visit.     Physical Exam  Reviewed ECG and NP notes  Assessment:     1. Fatigue, unspecified type    2. HTN (hypertension), benign        Plan:   Discussed diet , achieving and maintaining ideal body weight, and exercise.   We reviewed meds in detail.  Reassured-Discussed goals, options, plan.'  Omega-3 > 800 mg/d combined EPA/DHA.  Explained that there is no mechanism for valsartan to cause faitigue  Later could increase Valsartan to -320 if needed  Discussed pros and cons of very low dose Atenolol  Diagnoses and all orders for this visit:    Fatigue, unspecified type    HTN (hypertension), benign            Follow up as needed.

## 2025-01-22 ENCOUNTER — OFFICE VISIT (OUTPATIENT)
Dept: CARDIOLOGY | Facility: CLINIC | Age: 35
End: 2025-01-22
Payer: COMMERCIAL

## 2025-01-22 DIAGNOSIS — I10 HTN (HYPERTENSION), BENIGN: ICD-10-CM

## 2025-01-22 DIAGNOSIS — R53.83 FATIGUE, UNSPECIFIED TYPE: Primary | ICD-10-CM

## 2025-01-22 NOTE — PATIENT INSTRUCTIONS
Discussed diet , achieving and maintaining ideal body weight, and exercise.   We reviewed meds in detail.  Reassured-Discussed goals, options, plan.'  Omega-3 > 800 mg/d combined EPA/DHA.  Explained that there is no mechanism for valsartan to cause faitigue  Later could increase Valsartan to -320 if needed  Discussed pros and cons of very low dose Atenolol

## 2025-01-27 ENCOUNTER — PATIENT MESSAGE (OUTPATIENT)
Dept: CARDIOLOGY | Facility: CLINIC | Age: 35
End: 2025-01-27
Payer: COMMERCIAL

## 2025-03-28 ENCOUNTER — PATIENT MESSAGE (OUTPATIENT)
Dept: CARDIOLOGY | Facility: CLINIC | Age: 35
End: 2025-03-28
Payer: COMMERCIAL

## 2025-03-28 DIAGNOSIS — I10 PRIMARY HYPERTENSION: ICD-10-CM

## 2025-03-28 RX ORDER — VALSARTAN 40 MG/1
40 TABLET ORAL DAILY
Qty: 90 TABLET | Refills: 3 | Status: SHIPPED | OUTPATIENT
Start: 2025-03-28 | End: 2026-03-28

## 2025-05-01 ENCOUNTER — OFFICE VISIT (OUTPATIENT)
Dept: PSYCHIATRY | Facility: CLINIC | Age: 35
End: 2025-05-01
Payer: COMMERCIAL

## 2025-05-01 DIAGNOSIS — F90.0 ATTENTION DEFICIT HYPERACTIVITY DISORDER (ADHD), PREDOMINANTLY INATTENTIVE TYPE: ICD-10-CM

## 2025-05-01 PROCEDURE — 98006 SYNCH AUDIO-VIDEO EST MOD 30: CPT | Mod: 95,,, | Performed by: NURSE PRACTITIONER

## 2025-05-01 PROCEDURE — 4010F ACE/ARB THERAPY RXD/TAKEN: CPT | Mod: CPTII,95,, | Performed by: NURSE PRACTITIONER

## 2025-05-01 RX ORDER — LISDEXAMFETAMINE DIMESYLATE 30 MG/1
30 CAPSULE ORAL EVERY MORNING
Qty: 30 CAPSULE | Refills: 0 | Status: SHIPPED | OUTPATIENT
Start: 2025-05-01

## 2025-05-01 RX ORDER — LISDEXAMFETAMINE DIMESYLATE 30 MG/1
30 CAPSULE ORAL EVERY MORNING
Qty: 30 CAPSULE | Refills: 0 | Status: SHIPPED | OUTPATIENT
Start: 2025-06-30

## 2025-05-01 RX ORDER — LISDEXAMFETAMINE DIMESYLATE 30 MG/1
30 CAPSULE ORAL EVERY MORNING
Qty: 30 CAPSULE | Refills: 0 | Status: SHIPPED | OUTPATIENT
Start: 2025-05-31

## 2025-05-01 NOTE — PROGRESS NOTES
"  Outpatient Psychiatry Follow-Up Visit (MD/NP)    5/1/2025    Clinical Status of Patient:  Outpatient (Ambulatory)    Chief Complaint:  Kei Sharma is a 35 y.o. male who presents today for follow-up of attention problems.  Met with patient.      Last visit was: with psych resident. Chart and  reviewed.   The patient location is: home  The chief complaint leading to consultation is: attention problems    Visit type: audiovisual    Face to Face time with patient: 30 minutes  30 minutes of total time spent on the encounter, which includes face to face time and non-face to face time preparing to see the patient (eg, review of tests), Obtaining and/or reviewing separately obtained history, Documenting clinical information in the electronic or other health record, Independently interpreting results (not separately reported) and communicating results to the patient/family/caregiver, or Care coordination (not separately reported).     Each patient to whom he or she provides medical services by telemedicine is:  (1) informed of the relationship between the physician and patient and the respective role of any other health care provider with respect to management of the patient; and (2) notified that he or she may decline to receive medical services by telemedicine and may withdraw from such care at any time.    Interval History and Content of Current Session:  Current Psychiatric Medications/changes  Continue Vyvanse 30 mg daily  Fill Dates: 9/11, 10/10, 11/08)  Follow-up with me every 3 months by virtual visit for refills.    Virtual Visit: Established therapeutic rapport and transition of care from previous provider. Pt presents bright affect and euthymic mood. Pt states, "I've been doing well" .Reports effective response to ADHD medications and denies side effects. Denies any unmanaged mood, anxiety, or insomnia symptoms.  Denies SI/HI/AVH. Will continue current medications    Psychotherapy:  Target " symptoms: distractability, lack of focus  Why chosen therapy is appropriate versus another modality: relevant to diagnosis  Outcome monitoring methods: self-report  Therapeutic intervention type: insight oriented psychotherapy  Topics discussed/themes: building skills sets for symptom management, symptom recognition  The patient's response to the intervention is accepting. The patient's progress toward treatment goals is good.   Duration of intervention: 11 minutes.    Review of Systems   PSYCHIATRIC: Pertinant items are noted in the narrative.  CONSTITUTIONAL: No weight gain or loss.   MUSCULOSKELETAL: No pain or stiffness of the joints.  NEUROLOGIC: No weakness, sensory changes, seizures, confusion, memory loss, tremor or other abnormal movements.  ENDOCRINE: No polydipsia or polyuria.  INTEGUMENTARY: No rashes or lacerations.  EYES: No exophthalmos, jaundice or blindness.  ENT: No dizziness, tinnitus or hearing loss.  RESPIRATORY: No shortness of breath.  CARDIOVASCULAR: No tachycardia or chest pain.  GASTROINTESTINAL: No nausea, vomiting, pain, constipation or diarrhea.  GENITOURINARY: No frequency, dysuria or sexual dysfunction.  HEMATOLOGIC/LYMPHATIC: No excessive bleeding, prolonged or excessive bleeding after dental extraction/injury.  ALLERGIC/IMMUNOLOGIC: No allergic response to materials, foods or animals at this time.    Past Medical, Family and Social History: The patient's past medical, family and social history have been reviewed and updated as appropriate within the electronic medical record - see encounter notes.    Compliance: yes    Side effects: None    Risk Parameters:  Patient reports no suicidal ideation  Patient reports no homicidal ideation  Patient reports no self-injurious behavior  Patient reports no violent behavior    Exam (detailed: at least 9 elements; comprehensive: all 15 elements)   Constitutional  Vitals:  Most recent vital signs, dated greater than 90 days prior to this  appointment, were reviewed.   There were no vitals filed for this visit.     General:  unremarkable, age appropriate     Musculoskeletal  Muscle Strength/Tone:  not examined   Gait & Station:  non-ataxic     Psychiatric  Speech:  no latency; no press   Mood & Affect:  steady  congruent and appropriate   Thought Process:  normal and logical   Associations:  intact   Thought Content:  normal, no suicidality, no homicidality, delusions, or paranoia   Insight:  intact   Judgement: behavior is adequate to circumstances   Orientation:  grossly intact   Memory: intact for content of interview   Language: grossly intact   Attention Span & Concentration:  able to focus   Fund of Knowledge:  intact and appropriate to age and level of education     Assessment and Diagnosis   Status/Progress: Based on the examination today, the patient's problem(s) is/are adequately but not ideally controlled.  New problems have not been presented today.   Co-morbidities and Lack of compliance are not complicating management of the primary condition.  There are no active rule-out diagnoses for this patient at this time.     General Impression:       ICD-10-CM ICD-9-CM   1. Attention deficit hyperactivity disorder (ADHD), predominantly inattentive type  F90.0 314.00         Intervention/Counseling/Treatment Plan   Medication Management: The risks and benefits of medication were discussed with the patient.  Continue Vyvanse 30 mg daily  Follow-up with me every 3 months by virtual visit for refills.    Return to Clinic: 3 months    Risks, benefits, side effects and alternative treatments discussed with patient. Patient agrees with the current plan as documented.  Encouraged Patient to keep future appointments.  Take medications as prescribed and abstain from substance abuse.  Pt to present to ED for thoughts to harm himself or others

## 2025-07-15 ENCOUNTER — OFFICE VISIT (OUTPATIENT)
Dept: PSYCHIATRY | Facility: CLINIC | Age: 35
End: 2025-07-15
Payer: COMMERCIAL

## 2025-07-15 DIAGNOSIS — F90.0 ATTENTION DEFICIT HYPERACTIVITY DISORDER (ADHD), PREDOMINANTLY INATTENTIVE TYPE: ICD-10-CM

## 2025-07-15 PROCEDURE — 98006 SYNCH AUDIO-VIDEO EST MOD 30: CPT | Mod: 95,,, | Performed by: NURSE PRACTITIONER

## 2025-07-15 PROCEDURE — 4010F ACE/ARB THERAPY RXD/TAKEN: CPT | Mod: CPTII,95,, | Performed by: NURSE PRACTITIONER

## 2025-07-15 RX ORDER — LISDEXAMFETAMINE DIMESYLATE 30 MG/1
30 CAPSULE ORAL EVERY MORNING
Qty: 30 CAPSULE | Refills: 0 | Status: SHIPPED | OUTPATIENT
Start: 2025-08-13

## 2025-07-15 RX ORDER — LISDEXAMFETAMINE DIMESYLATE 30 MG/1
30 CAPSULE ORAL EVERY MORNING
Qty: 30 CAPSULE | Refills: 0 | Status: SHIPPED | OUTPATIENT
Start: 2025-07-15

## 2025-07-15 RX ORDER — LISDEXAMFETAMINE DIMESYLATE 30 MG/1
30 CAPSULE ORAL EVERY MORNING
Qty: 30 CAPSULE | Refills: 0 | Status: SHIPPED | OUTPATIENT
Start: 2025-09-11

## 2025-07-15 NOTE — PROGRESS NOTES
"  Outpatient Psychiatry Follow-Up Visit (MD/NP)    7/15/2025    Clinical Status of Patient:  Outpatient (Ambulatory)    Chief Complaint:  Kei Sharma is a 35 y.o. male who presents today for follow-up of attention problems.  Met with patient.      Last visit was: 5/01/25.  Chart and  reviewed.   The patient location is: home  The chief complaint leading to consultation is: attention problems    Visit type: audiovisual    Face to Face time with patient: 30 minutes  30 minutes of total time spent on the encounter, which includes face to face time and non-face to face time preparing to see the patient (eg, review of tests), Obtaining and/or reviewing separately obtained history, Documenting clinical information in the electronic or other health record, Independently interpreting results (not separately reported) and communicating results to the patient/family/caregiver, or Care coordination (not separately reported).     Each patient to whom he or she provides medical services by telemedicine is:  (1) informed of the relationship between the physician and patient and the respective role of any other health care provider with respect to management of the patient; and (2) notified that he or she may decline to receive medical services by telemedicine and may withdraw from such care at any time.    Interval History and Content of Current Session:  Current Psychiatric Medications/changes  Continue Vyvanse 30 mg daily  Fill Dates: 9/11, 10/10, 11/08)  Follow-up with me every 3 months by virtual visit for refills.    Virtual Visit:  Pt presents bright affect and euthymic mood. Pt states, "I've been doing well" .Reports effective response to ADHD medications and denies side effects. Denies any unmanaged mood, anxiety, or insomnia symptoms.  Denies SI/HI/AVH. Will continue current medications    Psychotherapy:  Target symptoms: distractability, lack of focus  Why chosen therapy is appropriate versus another " modality: relevant to diagnosis  Outcome monitoring methods: self-report  Therapeutic intervention type: insight oriented psychotherapy  Topics discussed/themes: building skills sets for symptom management, symptom recognition  The patient's response to the intervention is accepting. The patient's progress toward treatment goals is good.   Duration of intervention: 11 minutes.    Review of Systems   PSYCHIATRIC: Pertinant items are noted in the narrative.  CONSTITUTIONAL: No weight gain or loss.   MUSCULOSKELETAL: No pain or stiffness of the joints.  NEUROLOGIC: No weakness, sensory changes, seizures, confusion, memory loss, tremor or other abnormal movements.  ENDOCRINE: No polydipsia or polyuria.  INTEGUMENTARY: No rashes or lacerations.  EYES: No exophthalmos, jaundice or blindness.  ENT: No dizziness, tinnitus or hearing loss.  RESPIRATORY: No shortness of breath.  CARDIOVASCULAR: No tachycardia or chest pain.  GASTROINTESTINAL: No nausea, vomiting, pain, constipation or diarrhea.  GENITOURINARY: No frequency, dysuria or sexual dysfunction.  HEMATOLOGIC/LYMPHATIC: No excessive bleeding, prolonged or excessive bleeding after dental extraction/injury.  ALLERGIC/IMMUNOLOGIC: No allergic response to materials, foods or animals at this time.    Past Medical, Family and Social History: The patient's past medical, family and social history have been reviewed and updated as appropriate within the electronic medical record - see encounter notes.    Compliance: yes    Side effects: None    Risk Parameters:  Patient reports no suicidal ideation  Patient reports no homicidal ideation  Patient reports no self-injurious behavior  Patient reports no violent behavior    Exam (detailed: at least 9 elements; comprehensive: all 15 elements)   Constitutional  Vitals:  Most recent vital signs, dated greater than 90 days prior to this appointment, were reviewed.   There were no vitals filed for this visit.     General:  unremarkable,  age appropriate     Musculoskeletal  Muscle Strength/Tone:  not examined   Gait & Station:  non-ataxic     Psychiatric  Speech:  no latency; no press   Mood & Affect:  steady  congruent and appropriate   Thought Process:  normal and logical   Associations:  intact   Thought Content:  normal, no suicidality, no homicidality, delusions, or paranoia   Insight:  intact   Judgement: behavior is adequate to circumstances   Orientation:  grossly intact   Memory: intact for content of interview   Language: grossly intact   Attention Span & Concentration:  able to focus   Fund of Knowledge:  intact and appropriate to age and level of education     Assessment and Diagnosis   Status/Progress: Based on the examination today, the patient's problem(s) is/are adequately but not ideally controlled.  New problems have not been presented today.   Co-morbidities and Lack of compliance are not complicating management of the primary condition.  There are no active rule-out diagnoses for this patient at this time.     General Impression:       ICD-10-CM ICD-9-CM   1. Attention deficit hyperactivity disorder (ADHD), predominantly inattentive type  F90.0 314.00     Intervention/Counseling/Treatment Plan   Medication Management: The risks and benefits of medication were discussed with the patient.  Continue Vyvanse 30 mg daily  Follow-up with me every 3 months by virtual visit for refills.    Return to Clinic: 3 months    Risks, benefits, side effects and alternative treatments discussed with patient. Patient agrees with the current plan as documented.  Encouraged Patient to keep future appointments.  Take medications as prescribed and abstain from substance abuse.  Pt to present to ED for thoughts to harm himself or others